# Patient Record
Sex: FEMALE | Race: BLACK OR AFRICAN AMERICAN | Employment: PART TIME | URBAN - METROPOLITAN AREA
[De-identification: names, ages, dates, MRNs, and addresses within clinical notes are randomized per-mention and may not be internally consistent; named-entity substitution may affect disease eponyms.]

---

## 2017-03-11 ENCOUNTER — APPOINTMENT (OUTPATIENT)
Dept: GENERAL RADIOLOGY | Age: 37
DRG: 133 | End: 2017-03-11
Attending: PHYSICIAN ASSISTANT
Payer: MEDICAID

## 2017-03-11 ENCOUNTER — HOSPITAL ENCOUNTER (INPATIENT)
Age: 37
LOS: 1 days | Discharge: HOME | DRG: 133 | End: 2017-03-13
Attending: EMERGENCY MEDICINE | Admitting: HOSPITALIST
Payer: MEDICAID

## 2017-03-11 ENCOUNTER — APPOINTMENT (OUTPATIENT)
Dept: NUCLEAR MEDICINE | Age: 37
DRG: 133 | End: 2017-03-11
Attending: PHYSICIAN ASSISTANT
Payer: MEDICAID

## 2017-03-11 DIAGNOSIS — R42 DIZZINESS: ICD-10-CM

## 2017-03-11 DIAGNOSIS — R09.02 HYPOXIA: Primary | ICD-10-CM

## 2017-03-11 LAB
ALBUMIN SERPL BCP-MCNC: 3.6 G/DL (ref 3.4–5)
ALBUMIN/GLOB SERPL: 0.8 {RATIO} (ref 0.8–1.7)
ALP SERPL-CCNC: 60 U/L (ref 45–117)
ALT SERPL-CCNC: 35 U/L (ref 13–56)
ANION GAP BLD CALC-SCNC: 7 MMOL/L (ref 3–18)
APPEARANCE UR: CLEAR
ARTERIAL PATENCY WRIST A: YES
AST SERPL W P-5'-P-CCNC: 37 U/L (ref 15–37)
BACTERIA URNS QL MICRO: ABNORMAL /HPF
BASE EXCESS BLD CALC-SCNC: 1 MMOL/L
BASOPHILS # BLD AUTO: 0 K/UL (ref 0–0.06)
BASOPHILS # BLD: 0 % (ref 0–2)
BDY SITE: ABNORMAL
BILIRUB SERPL-MCNC: 0.3 MG/DL (ref 0.2–1)
BILIRUB UR QL: ABNORMAL
BNP SERPL-MCNC: 34 PG/ML (ref 0–450)
BUN SERPL-MCNC: 17 MG/DL (ref 7–18)
BUN/CREAT SERPL: 9 (ref 12–20)
CALCIUM SERPL-MCNC: 8.8 MG/DL (ref 8.5–10.1)
CHLORIDE SERPL-SCNC: 94 MMOL/L (ref 100–108)
CK MB CFR SERPL CALC: 0.3 % (ref 0–4)
CK MB SERPL-MCNC: 1.7 NG/ML (ref 5–25)
CK SERPL-CCNC: 525 U/L (ref 26–192)
CO2 SERPL-SCNC: 34 MMOL/L (ref 21–32)
COLOR UR: YELLOW
CREAT SERPL-MCNC: 1.94 MG/DL (ref 0.6–1.3)
D DIMER PPP FEU-MCNC: 0.43 UG/ML(FEU)
DIFFERENTIAL METHOD BLD: ABNORMAL
EOSINOPHIL # BLD: 0 K/UL (ref 0–0.4)
EOSINOPHIL NFR BLD: 1 % (ref 0–5)
EPITH CASTS URNS QL MICRO: ABNORMAL /LPF (ref 0–5)
ERYTHROCYTE [DISTWIDTH] IN BLOOD BY AUTOMATED COUNT: 12.9 % (ref 11.6–14.5)
FLUAV AG NPH QL IA: NEGATIVE
FLUBV AG NOSE QL IA: NEGATIVE
GAS FLOW.O2 O2 DELIVERY SYS: ABNORMAL L/MIN
GAS FLOW.O2 SETTING OXYMISER: 2 L/M
GLOBULIN SER CALC-MCNC: 4.6 G/DL (ref 2–4)
GLUCOSE SERPL-MCNC: 92 MG/DL (ref 74–99)
GLUCOSE UR STRIP.AUTO-MCNC: NEGATIVE MG/DL
HCG SERPL QL: NEGATIVE
HCG UR QL: NEGATIVE
HCO3 BLD-SCNC: 28.2 MMOL/L (ref 22–26)
HCT VFR BLD AUTO: 43.4 % (ref 35–45)
HGB BLD-MCNC: 13.8 G/DL (ref 12–16)
HGB UR QL STRIP: NEGATIVE
KETONES UR QL STRIP.AUTO: 15 MG/DL
LEUKOCYTE ESTERASE UR QL STRIP.AUTO: NEGATIVE
LYMPHOCYTES # BLD AUTO: 20 % (ref 21–52)
LYMPHOCYTES # BLD: 1.1 K/UL (ref 0.9–3.6)
MAGNESIUM SERPL-MCNC: 1.7 MG/DL (ref 1.8–2.4)
MCH RBC QN AUTO: 29.4 PG (ref 24–34)
MCHC RBC AUTO-ENTMCNC: 31.8 G/DL (ref 31–37)
MCV RBC AUTO: 92.3 FL (ref 74–97)
MONOCYTES # BLD: 1.4 K/UL (ref 0.05–1.2)
MONOCYTES NFR BLD AUTO: 25 % (ref 3–10)
NEUTS SEG # BLD: 3.1 K/UL (ref 1.8–8)
NEUTS SEG NFR BLD AUTO: 54 % (ref 40–73)
NITRITE UR QL STRIP.AUTO: NEGATIVE
PCO2 BLD: 52 MMHG (ref 35–45)
PH BLD: 7.34 [PH] (ref 7.35–7.45)
PH UR STRIP: 5.5 [PH] (ref 5–8)
PLATELET # BLD AUTO: 294 K/UL (ref 135–420)
PMV BLD AUTO: 9.4 FL (ref 9.2–11.8)
PO2 BLD: 72 MMHG (ref 80–100)
POTASSIUM SERPL-SCNC: 3.8 MMOL/L (ref 3.5–5.5)
PROT SERPL-MCNC: 8.2 G/DL (ref 6.4–8.2)
PROT UR STRIP-MCNC: 100 MG/DL
RBC # BLD AUTO: 4.7 M/UL (ref 4.2–5.3)
RBC #/AREA URNS HPF: ABNORMAL /HPF (ref 0–5)
SAO2 % BLD: 93 % (ref 92–97)
SERVICE CMNT-IMP: ABNORMAL
SODIUM SERPL-SCNC: 135 MMOL/L (ref 136–145)
SP GR UR REFRACTOMETRY: 1.02 (ref 1–1.03)
SPECIMEN TYPE: ABNORMAL
TOTAL RESP. RATE, ITRR: 20
TROPONIN I SERPL-MCNC: <0.02 NG/ML (ref 0–0.04)
UROBILINOGEN UR QL STRIP.AUTO: 1 EU/DL (ref 0.2–1)
WBC # BLD AUTO: 5.7 K/UL (ref 4.6–13.2)
WBC URNS QL MICRO: ABNORMAL /HPF (ref 0–4)

## 2017-03-11 PROCEDURE — 96365 THER/PROPH/DIAG IV INF INIT: CPT

## 2017-03-11 PROCEDURE — 80053 COMPREHEN METABOLIC PANEL: CPT | Performed by: PHYSICIAN ASSISTANT

## 2017-03-11 PROCEDURE — 71010 XR CHEST PORT: CPT

## 2017-03-11 PROCEDURE — 74011250636 HC RX REV CODE- 250/636: Performed by: PHYSICIAN ASSISTANT

## 2017-03-11 PROCEDURE — 81025 URINE PREGNANCY TEST: CPT | Performed by: PHYSICIAN ASSISTANT

## 2017-03-11 PROCEDURE — 96361 HYDRATE IV INFUSION ADD-ON: CPT

## 2017-03-11 PROCEDURE — 85025 COMPLETE CBC W/AUTO DIFF WBC: CPT | Performed by: PHYSICIAN ASSISTANT

## 2017-03-11 PROCEDURE — 82803 BLOOD GASES ANY COMBINATION: CPT

## 2017-03-11 PROCEDURE — 87804 INFLUENZA ASSAY W/OPTIC: CPT | Performed by: PHYSICIAN ASSISTANT

## 2017-03-11 PROCEDURE — 84703 CHORIONIC GONADOTROPIN ASSAY: CPT | Performed by: PHYSICIAN ASSISTANT

## 2017-03-11 PROCEDURE — 85379 FIBRIN DEGRADATION QUANT: CPT | Performed by: PHYSICIAN ASSISTANT

## 2017-03-11 PROCEDURE — 83880 ASSAY OF NATRIURETIC PEPTIDE: CPT | Performed by: PHYSICIAN ASSISTANT

## 2017-03-11 PROCEDURE — 83735 ASSAY OF MAGNESIUM: CPT | Performed by: PHYSICIAN ASSISTANT

## 2017-03-11 PROCEDURE — A9567 TECHNETIUM TC-99M AEROSOL: HCPCS

## 2017-03-11 PROCEDURE — 99285 EMERGENCY DEPT VISIT HI MDM: CPT

## 2017-03-11 PROCEDURE — 93005 ELECTROCARDIOGRAM TRACING: CPT

## 2017-03-11 PROCEDURE — 74011000250 HC RX REV CODE- 250: Performed by: PHYSICIAN ASSISTANT

## 2017-03-11 PROCEDURE — 96375 TX/PRO/DX INJ NEW DRUG ADDON: CPT

## 2017-03-11 PROCEDURE — 82550 ASSAY OF CK (CPK): CPT | Performed by: PHYSICIAN ASSISTANT

## 2017-03-11 PROCEDURE — 81001 URINALYSIS AUTO W/SCOPE: CPT | Performed by: PHYSICIAN ASSISTANT

## 2017-03-11 PROCEDURE — 36600 WITHDRAWAL OF ARTERIAL BLOOD: CPT

## 2017-03-11 RX ORDER — MAGNESIUM SULFATE HEPTAHYDRATE 40 MG/ML
2 INJECTION, SOLUTION INTRAVENOUS
Status: COMPLETED | OUTPATIENT
Start: 2017-03-11 | End: 2017-03-11

## 2017-03-11 RX ORDER — SODIUM CHLORIDE 9 MG/ML
1000 INJECTION, SOLUTION INTRAVENOUS ONCE
Status: COMPLETED | OUTPATIENT
Start: 2017-03-11 | End: 2017-03-11

## 2017-03-11 RX ORDER — IPRATROPIUM BROMIDE AND ALBUTEROL SULFATE 2.5; .5 MG/3ML; MG/3ML
3 SOLUTION RESPIRATORY (INHALATION)
Status: COMPLETED | OUTPATIENT
Start: 2017-03-11 | End: 2017-03-11

## 2017-03-11 RX ORDER — IPRATROPIUM BROMIDE AND ALBUTEROL SULFATE 2.5; .5 MG/3ML; MG/3ML
3 SOLUTION RESPIRATORY (INHALATION)
Status: COMPLETED | OUTPATIENT
Start: 2017-03-11 | End: 2017-03-12

## 2017-03-11 RX ADMIN — IPRATROPIUM BROMIDE AND ALBUTEROL SULFATE 3 ML: .5; 3 SOLUTION RESPIRATORY (INHALATION) at 19:14

## 2017-03-11 RX ADMIN — METHYLPREDNISOLONE SODIUM SUCCINATE 125 MG: 125 INJECTION, POWDER, FOR SOLUTION INTRAMUSCULAR; INTRAVENOUS at 19:14

## 2017-03-11 RX ADMIN — SODIUM CHLORIDE 1000 ML: 900 INJECTION, SOLUTION INTRAVENOUS at 19:13

## 2017-03-11 RX ADMIN — MAGNESIUM SULFATE HEPTAHYDRATE 2 G: 40 INJECTION, SOLUTION INTRAVENOUS at 19:15

## 2017-03-11 NOTE — IP AVS SNAPSHOT
[address] Patient: Kurtis Rain MRN: [id number] HOR:[birth year] You are allergic to the following Allergen Reactions Aspirin Nausea and Vomiting Swelling Recent Documentation Height Weight Breastfeeding? BMI Smoking Status 1.651 m (!) 190.1 kg No 69.73 kg/m2 Never Smoker Emergency Contacts Name Discharge Info Relation Home Work Mobile Ravinder Gutierrez DISCHARGE CAREGIVER [3] Parent [1] [phone] About your hospitalization You were admitted on:  March 12, 2017 You last received care in the:  [facility] [address] You were discharged on:  March 13, 2017 Unit phone number:  [phone] Why you were hospitalized Your primary diagnosis was:  Not on File Your diagnoses also included:  Hypoxia, Asthmatic Bronchitis With Acute Exacerbation Providers Seen During Your Hospitalizations Provider Role Specialty Primary office phone Gabe Harrell MD Attending Provider Emergency Medicine [phone] Dominic Byrd MD Attending Provider Emergency Medicine [phone] Odalys Steward MD Attending Provider Internal Medicine [phone] Your Primary Care Physician (PCP) Primary Care Physician Office Phone Office Fax OTHER, PHYS ** None ** ** None ** Follow-up Information Follow up With Details Comments Contact Info Corinna Titus MD   Patient can only remember the practice name and not the physician Current Discharge Medication List  
  
START taking these medications Dose & Instructions Dispensing Information Comments Morning Noon Evening Bedtime  
 doxycycline 100 mg capsule Commonly known as:  VIBRAMYCIN Your last dose was: Your next dose is: Other:  _________  Dose:  100 mg  
 Take 1 Cap by mouth every twelve (12) hours for 5 days. Quantity:  10 Cap Refills:  0  
     
   
   
   
  
 predniSONE 20 mg tablet Commonly known as:  Nathaniel Dixon Your last dose was: Your next dose is: Other:  _________ Dose:  60 mg Take 3 Tabs by mouth daily (after dinner) for 4 days. Quantity:  12 Tab Refills:  0 CONTINUE these medications which have NOT CHANGED Dose & Instructions Dispensing Information Comments Morning Noon Evening Bedtime  
 chlorthalidone 25 mg tablet Commonly known as:  Talisha Hutson Your last dose was: Your next dose is: Other:  _________ Dose:  25 mg Take 25 mg by mouth daily. Refills:  0  
     
   
   
   
  
 fluticasone-salmeterol 250-50 mcg/dose diskus inhaler Commonly known as:  ADVAIR Your last dose was: Your next dose is: Other:  _________ Dose:  1 Puff Take 1 Puff by inhalation two (2) times a day. Quantity:  1 Inhaler Refills:  1  
     
   
   
   
  
 guaiFENesin-codeine 100-10 mg/5 mL solution Commonly known as:  ROBITUSSIN AC Your last dose was: Your next dose is: Other:  _________ Dose:  5 mL Take 5 mL by mouth three (3) times daily as needed for Cough. Max Daily Amount: 15 mL. Quantity:  90 mL Refills:  0  
     
   
   
   
  
 hydrOXYzine HCl 10 mg tablet Commonly known as:  ATARAX Your last dose was: Your next dose is: Other:  _________ Take  by mouth every eight (8) hours as needed for Itching or Anxiety. Refills:  0 LIPITOR 40 mg tablet Generic drug:  atorvastatin Your last dose was: Your next dose is: Other:  _________ Dose:  40 mg Take 40 mg by mouth nightly. Refills:  0  
     
   
   
   
  
 metFORMIN 500 mg tablet Commonly known as:  GLUCOPHAGE  
   
 Your last dose was: Your next dose is: Other:  _________ Dose:  500 mg Take 500 mg by mouth two (2) times daily (with meals). Refills:  0  
     
   
   
   
  
 spironolactone 100 mg tablet Commonly known as:  ALDACTONE Your last dose was: Your next dose is: Other:  _________ Dose:  100 mg Take 100 mg by mouth daily. Refills:  0  
     
   
   
   
  
 VITAMIN D2 50,000 unit capsule Generic drug:  ergocalciferol Your last dose was: Your next dose is: Other:  _________ Dose:  15082 Units Take 50,000 Units by mouth every seven (7) days. Refills:  0 STOP taking these medications   
 amLODIPine 10 mg tablet Commonly known as:  NORVASC  
   
  
 benazepril 20 mg tablet Commonly known as:  LOTENSIN  
   
  
 carvedilol 25 mg tablet Commonly known as:  COREG  
   
  
 furosemide 20 mg tablet Commonly known as:  LASIX  
   
  
 labetalol 200 mg tablet Commonly known as:  Marlane Angelucci Where to Get Your Medications Information on where to get these meds will be given to you by the nurse or doctor. ! Ask your nurse or doctor about these medications  
  doxycycline 100 mg capsule  
 predniSONE 20 mg tablet Discharge Instructions Asthma Attack: Care Instructions Your Care Instructions During an asthma attack, the airways swell and narrow. This makes it hard to breathe. Severe asthma attacks can be life-threatening, but you can help prevent them by keeping your asthma under control and treating symptoms before they get bad. Symptoms include being short of breath, having chest tightness, coughing, and wheezing. Noting and treating these symptoms can also help you avoid future trips to the emergency room. The doctor has checked you carefully, but problems can develop later.  If you notice any problems or new symptoms, get medical treatment right away. Follow-up care is a key part of your treatment and safety. Be sure to make and go to all appointments, and call your doctor if you are having problems. It's also a good idea to know your test results and keep a list of the medicines you take. How can you care for yourself at home? · Follow your asthma action plan to prevent and treat attacks. If you don't have an asthma action plan, work with your doctor to create one. · Take your asthma medicines exactly as prescribed. Talk to your doctor right away if you have any questions about how to take them. ¨ Use your quick-relief medicine when you have symptoms of an attack. Quick-relief medicine is usually an albuterol inhaler. Some people need to use quick-relief medicine before they exercise. ¨ Take your controller medicine every day, not just when you have symptoms. Controller medicine is usually an inhaled corticosteroid. The goal is to prevent problems before they occur. Don't use your controller medicine to treat an attack that has already started. It doesn't work fast enough to help. ¨ If your doctor prescribed corticosteroid pills to use during an attack, take them exactly as prescribed. It may take hours for the pills to work, but they may make the episode shorter and help you breathe better. ¨ Keep your quick-relief medicine with you at all times. · Talk to your doctor before using other medicines. Some medicines, such as aspirin, can cause asthma attacks in some people. · If you have a peak flow meter, use it to check how well you are breathing. This can help you predict when an asthma attack is going to occur. Then you can take medicine to prevent the asthma attack or make it less severe. · Do not smoke or allow others to smoke around you. Avoid smoky places. Smoking makes asthma worse.  If you need help quitting, talk to your doctor about stop-smoking programs and medicines. These can increase your chances of quitting for good. · Learn what triggers an asthma attack for you, and avoid the triggers when you can. Common triggers include colds, smoke, air pollution, dust, pollen, mold, pets, cockroaches, stress, and cold air. · Avoid colds and the flu. Get a pneumococcal vaccine shot. If you have had one before, ask your doctor if you need a second dose. Get a flu vaccine every fall. If you must be around people with colds or the flu, wash your hands often. When should you call for help? Call 911 anytime you think you may need emergency care. For example, call if: 
· You have severe trouble breathing. Call your doctor now or seek immediate medical care if: 
· Your symptoms do not get better after you have followed your asthma action plan. · You have new or worse trouble breathing. · Your coughing and wheezing get worse. · You cough up dark brown or bloody mucus (sputum). · You have a new or higher fever. Watch closely for changes in your health, and be sure to contact your doctor if: 
· You need to use quick-relief medicine on more than 2 days a week (unless it is just for exercise). · You cough more deeply or more often, especially if you notice more mucus or a change in the color of your mucus. · You are not getting better as expected. Where can you learn more? Go to http://dilcia-esha.info/. Enter N709 in the search box to learn more about \"Asthma Attack: Care Instructions. \" Current as of: May 23, 2016 Content Version: 11.1 © 2396-5840 Healthwise, Incorporated. Care instructions adapted under license by [employer] (which disclaims liability or warranty for this information). If you have questions about a medical condition or this instruction, always ask your healthcare professional. [address] any warranty or liability for your use of this information. Learning About Asthma Triggers What are triggers? When you have asthma, certain things can make your symptoms worse. These are called triggers. They include: · Cigarette smoke or air pollution. · Things you are allergic to, such as: 
¨ Pollen, mold, or dust mites. ¨ Pet hair, skin, or saliva. · Illnesses, like colds, flu, or pneumonia. · Exercise. · Dry, cold air. How do triggers affect asthma? Triggers can make it harder for your lungs to work as they should and can lead to sudden difficulty breathing and other symptoms. When you are around a trigger, an asthma attack is more likely. If your symptoms are severe, you may need emergency treatment or have to go to the hospital for treatment. If you know what your triggers are and can avoid them, you may be able to prevent asthma attacks, reduce how often you have them, and make them less severe. What can you do to avoid triggers? The first thing is to know your triggers. When you are having symptoms, note the things around you that might be causing them. Then look for patterns in what may be triggering your symptoms. Record your triggers on a piece of paper or in an asthma diary. When you have your list of possible triggers, work with your doctor to find ways to avoid them. You also can check how well your lungs are working by measuring your peak expiratory flow (PEF) throughout the day. Your PEF may drop when you are near things that trigger symptoms. Here are some ways to avoid a few common triggers. · Do not smoke or allow others to smoke around you. If you need help quitting, talk to your doctor about stop-smoking programs and medicines. These can increase your chances of quitting for good. · If there is a lot of pollution, pollen, or dust outside, stay at home and keep your windows closed. Use an air conditioner or air filter in your home. Check your local weather report or newspaper for air quality and pollen reports. · Get a flu shot every year. Talk to your doctor about getting a pneumococcal shot. Wash your hands often to prevent infections. · Avoid exercising outdoors in cold weather. If you are outdoors in cold weather, wear a scarf around your face and breathe through your nose. How can you manage an asthma attack? · If you have an asthma action plan, follow the plan. In general: ¨ Use your quick-relief inhaler as directed by your doctor. If your symptoms do not get better after you use your medicine, have someone take you to the emergency room. Call an ambulance if needed. ¨ If your doctor has given you other inhaled medicines or steroid pills, take them as directed. Where can you learn more? Go to ticketstreet.be Enter V705 in the search box to learn more about \"Learning About Asthma Triggers. \"  
© 0022-6252 Healthwise, Incorporated. Care instructions adapted under license by [employer] (which disclaims liability or warranty for this information). This care instruction is for use with your licensed healthcare professional. If you have questions about a medical condition or this instruction, always ask your healthcare professional. [place] [address] any warranty or liability for your use of this information. Content Version: 32.7.649430; Last Revised: February 23, 2012 Asthma Action Plan: After Your Visit Your Care Instructions An asthma action plan is based on peak flow and asthma symptoms. Sorting symptoms and peak flow into red, yellow, and green \"zones\" can help you know how bad your asthma is and what actions you should take. Work with your doctor to make your plan. An action plan may include: · The peak flow readings and symptoms for each zone. · What medicines to take in each zone. · When to call a doctor. · A list of emergency contact numbers. · A list of your asthma triggers. Follow-up care is a key part of your treatment and safety. Be sure to make and go to all appointments, and call your doctor if you are having problems. It's also a good idea to know your test results and keep a list of the medicines you take. How can you care for yourself at home? · Take your daily medicines to help minimize long-term damage and avoid asthma attacks. · Check your peak flow every morning and evening. This is the best way to know how well your lungs are working. · Check your action plan to see what zone you are in. 
¨ If you are in the green zone, keep taking your daily asthma medicines as prescribed. ¨ If you are in the yellow zone, you may be having or will soon have an asthma attack. You may not have any symptoms, but your lungs are not working as well as they should. Take the medicines listed in your action plan. If you stay in the yellow zone, your doctor may need to increase the dose or add a medicine. ¨ If you are in the red zone, follow your action plan. If your symptoms or peak flow don't improve soon, you may need to go to the emergency room or be admitted to the hospital. 
· Use an asthma diary. Write down your peak flow readings in the asthma diary. If you have an attack, write down what caused it (if you know), the symptoms, and what medicine you took. · Make sure you know how and when to call your doctor or go to the hospital. 
· Take both the asthma action plan and the asthma diaryalong with your peak flow meter and medicineswhen you see your doctor. Tell your doctor if you are having trouble following your action plan. When should you call for help? Call 911 anytime you think you may need emergency care. For example, call if: 
· You have severe trouble breathing. Call your doctor now or seek immediate medical care if: 
· Your symptoms do not get better after you have followed your asthma action plan. · You cough up yellow, dark brown, or bloody mucus (sputum). Watch closely for changes in your health, and be sure to contact your doctor if: 
· Your coughing and wheezing get worse. · You need to use quick-relief medicine on more than 2 days a week (unless it is just for exercise). · You need help figuring out what is triggering your asthma attacks. Where can you learn more? Go to [employer].be Enter 728 3392 in the search box to learn more about \"Asthma Action Plan: After Your Visit. \"  
© 9848-9681 Healthwise, Incorporated. Care instructions adapted under license by [employer] (which disclaims liability or warranty for this information). This care instruction is for use with your licensed healthcare professional. If you have questions about a medical condition or this instruction, always ask your healthcare professional. [address] any warranty or liability for your use of this information. Content Version: 37.0.971950; Last Revised: March 9, 2012 DISCHARGE SUMMARY from Nurse The following personal items are in your possession at time of discharge: 
 
Dental Appliances: None Visual Aid: None Home Medications: None Jewelry: Ring Clothing: At bedside, Pants, Dress Other Valuables: Cell Phone, [employer] PATIENT INSTRUCTIONS: 
 
 
F-face looks uneven A-arms unable to move or move unevenly S-speech slurred or non-existent T-time-call 911 as soon as signs and symptoms begin-DO NOT go Back to bed or wait to see if you get better-TIME IS BRAIN. Warning Signs of HEART ATTACK Call 911 if you have these symptoms: 
? Chest discomfort.  Most heart attacks involve discomfort in the center of the chest that lasts more than a few minutes, or that goes away and comes back. It can feel like uncomfortable pressure, squeezing, fullness, or pain. ? Discomfort in other areas of the upper body. Symptoms can include pain or discomfort in one or both arms, the back, neck, jaw, or stomach. ? Shortness of breath with or without chest discomfort. ? Other signs may include breaking out in a cold sweat, nausea, or lightheadedness. Don't wait more than five minutes to call [address]! Fast action can save your life. Calling 911 is almost always the fastest way to get lifesaving treatment. Emergency Medical Services staff can begin treatment when they arrive  up to an hour sooner than if someone gets to the hospital by car. The discharge information has been reviewed with the patient. The patient verbalized understanding. Discharge medications reviewed with the patient and appropriate educational materials and side effects teaching were provided. DISCHARGE SUMMARY from Nurse The following personal items are in your possession at time of discharge: 
 
Dental Appliances: None Visual Aid: None Home Medications: None Jewelry: Ring Clothing: At bedside, Pants, Dress Other Valuables: Cell Phone, Adela PATIENT INSTRUCTIONS: 
 
 
F-face looks uneven A-arms unable to move or move unevenly S-speech slurred or non-existent T-time-call 911 as soon as signs and symptoms begin-DO NOT go Back to bed or wait to see if you get better-TIME IS BRAIN. Warning Signs of HEART ATTACK Call 911 if you have these symptoms: 
? Chest discomfort.  Most heart attacks involve discomfort in the center of the chest that lasts more than a few minutes, or that goes away and comes back. It can feel like uncomfortable pressure, squeezing, fullness, or pain. ? Discomfort in other areas of the upper body. Symptoms can include pain or discomfort in one or both arms, the back, neck, jaw, or stomach. ? Shortness of breath with or without chest discomfort. ? Other signs may include breaking out in a cold sweat, nausea, or lightheadedness. Don't wait more than five minutes to call [address]! Fast action can save your life. Calling 911 is almost always the fastest way to get lifesaving treatment. Emergency Medical Services staff can begin treatment when they arrive  up to an hour sooner than if someone gets to the hospital by car. The discharge information has been reviewed with the patient. The patient verbalized understanding. Discharge medications reviewed with the patient and appropriate educational materials and side effects teaching were provided. Patient {[id number] [employer] Activation Thank you for requesting access to [employer]. Please follow the instructions below to securely access and download your online medical record. [employer] allows you to send messages to your doctor, view your test results, renew your prescriptions, schedule appointments, and more. How Do I Sign Up? 1. In your internet browser, go to [email or web address] 
2. Click on the First Time User? Click Here link in the Sign In box. You will be redirect to the New Member Sign Up page. 3. Enter your [employer] Access Code exactly as it appears below. You will not need to use this code after youve completed the sign-up process. If you do not sign up before the expiration date, you must request a new code. [employer] Access Code: [email or web address] Expires: 2017  1:21 PM (This is the date your [employer] access code will ) 4. Enter the last four digits of your Social Security Number (xxxx) and Date of Birth (mm/dd/yyyy) as indicated and click Submit.  You will be taken to the next sign-up page. 5. Create a Systems[employer] ID. This will be your [employer] login ID and cannot be changed, so think of one that is secure and easy to remember. 6. Create a [employer] password. You can change your password at any time. 7. Enter your Password Reset Question and Answer. This can be used at a later time if you forget your password. 8. Enter your e-mail address. You will receive e-mail notification when new information is available in [address] 9. Click Sign Up. You can now view and download portions of your medical record. 10. Click the Download Summary menu link to download a portable copy of your medical information. Additional Information If you have questions, please visit the Frequently Asked Questions section of the Systems[employer] website at [email or web address]/. Remember, Systems[employer] is NOT to be used for urgent needs. For medical emergencies, dial 911. Discharge Orders None [employer] Announcement We are excited to announce that we are making your provider's discharge notes available to you in Systems[employer]. You will see these notes when they are completed and signed by the physician that discharged you from your recent hospital stay. If you have any questions or concerns about any information you see in Systems[employer], please call the Health Information Department where you were seen or reach out to your Primary Care Provider for more information about your plan of care. Introducing [facility]! [employer] introduces [employer] patient portal. Now you can access parts of your medical record, email your doctor's office, and request medication refills online. 1. In your internet browser, go to [email or web address] [email or web address] 2. Click on the First Time User? Click Here link in the Sign In box. You will see the New Member Sign Up page. 3. Enter your [employer] Access Code exactly as it appears below.  You will not need to use this code after youve completed the sign-up process. If you do not sign up before the expiration date, you must request a new code. · [employer] Access Code: [email or web address] Expires: 6/9/2017  1:21 PM 
 
4. Enter the last four digits of your Social Security Number (xxxx) and Date of Birth (mm/dd/yyyy) as indicated and click Submit. You will be taken to the next sign-up page. 5. Create a [employer] ID. This will be your [employer] login ID and cannot be changed, so think of one that is secure and easy to remember. 6. Create a [employer] password. You can change your password at any time. 7. Enter your Password Reset Question and Answer. This can be used at a later time if you forget your password. 8. Enter your e-mail address. You will receive e-mail notification when new information is available in [address] 9. Click Sign Up. You can now view and download portions of your medical record. 10. Click the Download Summary menu link to download a portable copy of your medical information. If you have questions, please visit the Frequently Asked Questions section of the [employer] website. Remember, [employer] is NOT to be used for urgent needs. For medical emergencies, dial 911. Now available from your iPhone and Android! General Information Please provide this summary of care documentation to your next provider. Patient Signature:  ____________________________________________________________ Date:  ____________________________________________________________  
  
Josph Perfect Provider Signature:  ____________________________________________________________ Date:  ____________________________________________________________

## 2017-03-11 NOTE — IP AVS SNAPSHOT
Summary of Care Report The Summary of Care report has been created to help improve care coordination. Users with access to [employer] or [employer] [address] (Web-based application) may access additional patient information including the Discharge Summary. If you are not currently a [address] user and need more information, please call the number listed below in the [place] [address] section and ask to be connected with Medical Records. Facility Information Name Address Phone [address] [place] [address] [address] [facility] [address] [phone] Patient Information Patient Name Sex STEPHEN Enciso ([id number]) Female 1980 Discharge Information Admitting Provider Service Area Unit Kelsey Garner III, MD / [facility] 71 / [phone] Discharge Provider Discharge Date/Time Discharge Disposition Destination Romy Guido MD / [phone] 17 1456 [facility] (none) Patient Language Language ENGLISH [13] Problem List as of 3/13/2017  Date Reviewed: 2012 Codes Priority Class Noted - Resolved Chronic obstructive asthma with exacerbation (HCC) ICD-10-CM: J44.1, J45.901 ICD-9-CM: 260.65   2012 - Present Acute bronchitis ICD-10-CM: J20.9 ICD-9-CM: 466.0   2012 - Present HTN (hypertension) ICD-10-CM: I10 
ICD-9-CM: 401.9   2012 - Present Respiratory failure with hypoxia [facility]) ICD-10-CM: J96.91 
ICD-9-CM: 518.81   2012 - Present Morbid obesity ([facility] [address] ICD-10-CM: E66.01 
ICD-9-CM: 278.01   2012 - Present Hypoventilation associated with obesity syndrome (HCC) ICD-10-CM: W28.6 ICD-9-CM: 278.03   2012 - Present Hypoxia ICD-10-CM: R09.02 
ICD-9-CM: 799.02   3/12/2017 - Present Asthmatic bronchitis with acute exacerbation ICD-10-CM: J45. [address] ICD-9-CM: 493.92   3/12/2017 - Present You are allergic to the following Allergen Reactions Aspirin Nausea and Vomiting Swelling Current Discharge Medication List  
  
START taking these medications Dose & Instructions Dispensing Information Comments  
 doxycycline 100 mg capsule Commonly known as:  VIBRAMYCIN Dose:  100 mg Take 1 Cap by mouth every twelve (12) hours for 5 days. Quantity:  10 Cap Refills:  0  
   
 predniSONE 20 mg tablet Commonly known as:  Orlean Aas Dose:  60 mg Take 3 Tabs by mouth daily (after dinner) for 4 days. Quantity:  12 Tab Refills:  0 CONTINUE these medications which have NOT CHANGED Dose & Instructions Dispensing Information Comments  
 chlorthalidone 25 mg tablet Commonly known as:  Chales Chamber Dose:  25 mg Take 25 mg by mouth daily. Refills:  0  
   
 fluticasone-salmeterol 250-50 mcg/dose diskus inhaler Commonly known as:  ADVAIR Dose:  1 Puff Take 1 Puff by inhalation two (2) times a day. Quantity:  1 Inhaler Refills:  1  
   
 guaiFENesin-codeine 100-10 mg/5 mL solution Commonly known as:  ROBITUSSIN AC Dose:  5 mL Take 5 mL by mouth three (3) times daily as needed for Cough. Max Daily Amount: 15 mL. Quantity:  90 mL Refills:  0  
   
 hydrOXYzine HCl 10 mg tablet Commonly known as:  ATARAX Take  by mouth every eight (8) hours as needed for Itching or Anxiety. Refills:  0 LIPITOR 40 mg tablet Generic drug:  atorvastatin Dose:  40 mg Take 40 mg by mouth nightly. Refills:  0  
   
 metFORMIN 500 mg tablet Commonly known as:  GLUCOPHAGE Dose:  500 mg Take 500 mg by mouth two (2) times daily (with meals). Refills:  0  
   
 spironolactone 100 mg tablet Commonly known as:  ALDACTONE Dose:  100 mg Take 100 mg by mouth daily. Refills:  0  
   
 VITAMIN D2 50,000 unit capsule Generic drug:  ergocalciferol Dose:  15967 Units Take 50,000 Units by mouth every seven (7) days. Refills:  0 STOP taking these medications Comments  
 amLODIPine 10 mg tablet Commonly known as:  NORVASC  
   
   
 benazepril 20 mg tablet Commonly known as:  LOTENSIN  
   
   
 carvedilol 25 mg tablet Commonly known as:  COREG  
   
   
 furosemide 20 mg tablet Commonly known as:  LASIX  
   
   
 labetalol 200 mg tablet Commonly known as:  Sarah Brennan Follow-up Information Follow up With Details Comments Contact Info Phys Other, MD   Patient can only remember the practice name and not the physician Discharge Instructions Asthma Attack: Care Instructions Your Care Instructions During an asthma attack, the airways swell and narrow. This makes it hard to breathe. Severe asthma attacks can be life-threatening, but you can help prevent them by keeping your asthma under control and treating symptoms before they get bad. Symptoms include being short of breath, having chest tightness, coughing, and wheezing. Noting and treating these symptoms can also help you avoid future trips to the emergency room. The doctor has checked you carefully, but problems can develop later. If you notice any problems or new symptoms, get medical treatment right away. Follow-up care is a key part of your treatment and safety. Be sure to make and go to all appointments, and call your doctor if you are having problems. It's also a good idea to know your test results and keep a list of the medicines you take. How can you care for yourself at home? · Follow your asthma action plan to prevent and treat attacks. If you don't have an asthma action plan, work with your doctor to create one. · Take your asthma medicines exactly as prescribed. Talk to your doctor right away if you have any questions about how to take them. ¨ Use your quick-relief medicine when you have symptoms of an attack. Quick-relief medicine is usually an albuterol inhaler. Some people need to use quick-relief medicine before they exercise. ¨ Take your controller medicine every day, not just when you have symptoms. Controller medicine is usually an inhaled corticosteroid. The goal is to prevent problems before they occur. Don't use your controller medicine to treat an attack that has already started. It doesn't work fast enough to help. ¨ If your doctor prescribed corticosteroid pills to use during an attack, take them exactly as prescribed. It may take hours for the pills to work, but they may make the episode shorter and help you breathe better. ¨ Keep your quick-relief medicine with you at all times. · Talk to your doctor before using other medicines. Some medicines, such as aspirin, can cause asthma attacks in some people. · If you have a peak flow meter, use it to check how well you are breathing. This can help you predict when an asthma attack is going to occur. Then you can take medicine to prevent the asthma attack or make it less severe. · Do not smoke or allow others to smoke around you. Avoid smoky places. Smoking makes asthma worse. If you need help quitting, talk to your doctor about stop-smoking programs and medicines. These can increase your chances of quitting for good. · Learn what triggers an asthma attack for you, and avoid the triggers when you can. Common triggers include colds, smoke, air pollution, dust, pollen, mold, pets, cockroaches, stress, and cold air. · Avoid colds and the flu. Get a pneumococcal vaccine shot. If you have had one before, ask your doctor if you need a second dose. Get a flu vaccine every fall. If you must be around people with colds or the flu, wash your hands often. When should you call for help? Call 911 anytime you think you may need emergency care. For example, call if: 
· You have severe trouble breathing. Call your doctor now or seek immediate medical care if: · Your symptoms do not get better after you have followed your asthma action plan. · You have new or worse trouble breathing. · Your coughing and wheezing get worse. · You cough up dark brown or bloody mucus (sputum). · You have a new or higher fever. Watch closely for changes in your health, and be sure to contact your doctor if: 
· You need to use quick-relief medicine on more than 2 days a week (unless it is just for exercise). · You cough more deeply or more often, especially if you notice more mucus or a change in the color of your mucus. · You are not getting better as expected. Where can you learn more? Go to http://dilcia-esha.info/. Enter Q034 in the search box to learn more about \"Asthma Attack: Care Instructions. \" Current as of: May 23, 2016 Content Version: 11.1 © 9894-2247 [employer]. Care instructions adapted under license by [employer] (which disclaims liability or warranty for this information). If you have questions about a medical condition or this instruction, always ask your healthcare professional. [address] any warranty or liability for your use of this information. Learning About Asthma Triggers What are triggers? When you have asthma, certain things can make your symptoms worse. These are called triggers. They include: · Cigarette smoke or air pollution. · Things you are allergic to, such as: 
¨ Pollen, mold, or dust mites. ¨ Pet hair, skin, or saliva. · Illnesses, like colds, flu, or pneumonia. · Exercise. · Dry, cold air. How do triggers affect asthma? Triggers can make it harder for your lungs to work as they should and can lead to sudden difficulty breathing and other symptoms. When you are around a trigger, an asthma attack is more likely. If your symptoms are severe, you may need emergency treatment or have to go to the hospital for treatment. If you know what your triggers are and can avoid them, you may be able to prevent asthma attacks, reduce how often you have them, and make them less severe. What can you do to avoid triggers? The first thing is to know your triggers. When you are having symptoms, note the things around you that might be causing them. Then look for patterns in what may be triggering your symptoms. Record your triggers on a piece of paper or in an asthma diary. When you have your list of possible triggers, work with your doctor to find ways to avoid them. You also can check how well your lungs are working by measuring your peak expiratory flow (PEF) throughout the day. Your PEF may drop when you are near things that trigger symptoms. Here are some ways to avoid a few common triggers. · Do not smoke or allow others to smoke around you. If you need help quitting, talk to your doctor about stop-smoking programs and medicines. These can increase your chances of quitting for good. · If there is a lot of pollution, pollen, or dust outside, stay at home and keep your windows closed. Use an air conditioner or air filter in your home. Check your local weather report or newspaper for air quality and pollen reports. · Get a flu shot every year. Talk to your doctor about getting a pneumococcal shot. Wash your hands often to prevent infections. · Avoid exercising outdoors in cold weather. If you are outdoors in cold weather, wear a scarf around your face and breathe through your nose. How can you manage an asthma attack? · If you have an asthma action plan, follow the plan. In general: ¨ Use your quick-relief inhaler as directed by your doctor. If your symptoms do not get better after you use your medicine, have someone take you to the emergency room. Call an ambulance if needed. ¨ If your doctor has given you other inhaled medicines or steroid pills, take them as directed. Where can you learn more? Go to [employer].be Enter J346 in the search box to learn more about \"Learning About Asthma Triggers. \"  
© 1541-4826 Healthwise, Incorporated. Care instructions adapted under license by [employer] (which disclaims liability or warranty for this information). This care instruction is for use with your licensed healthcare professional. If you have questions about a medical condition or this instruction, always ask your healthcare professional. [place] [address] any warranty or liability for your use of this information. Content Version: 42.2.393876; Last Revised: February 23, 2012 Asthma Action Plan: After Your Visit Your Care Instructions An asthma action plan is based on peak flow and asthma symptoms. Sorting symptoms and peak flow into red, yellow, and green \"zones\" can help you know how bad your asthma is and what actions you should take. Work with your doctor to make your plan. An action plan may include: · The peak flow readings and symptoms for each zone. · What medicines to take in each zone. · When to call a doctor. · A list of emergency contact numbers. · A list of your asthma triggers. Follow-up care is a key part of your treatment and safety. Be sure to make and go to all appointments, and call your doctor if you are having problems. It's also a good idea to know your test results and keep a list of the medicines you take. How can you care for yourself at home? · Take your daily medicines to help minimize long-term damage and avoid asthma attacks. · Check your peak flow every morning and evening. This is the best way to know how well your lungs are working. · Check your action plan to see what zone you are in. 
¨ If you are in the green zone, keep taking your daily asthma medicines as prescribed. ¨ If you are in the yellow zone, you may be having or will soon have an asthma attack.  You may not have any symptoms, but your lungs are not working as well as they should. Take the medicines listed in your action plan. If you stay in the yellow zone, your doctor may need to increase the dose or add a medicine. ¨ If you are in the red zone, follow your action plan. If your symptoms or peak flow don't improve soon, you may need to go to the emergency room or be admitted to the hospital. 
· Use an asthma diary. Write down your peak flow readings in the asthma diary. If you have an attack, write down what caused it (if you know), the symptoms, and what medicine you took. · Make sure you know how and when to call your doctor or go to the hospital. 
· Take both the asthma action plan and the asthma diaryalong with your peak flow meter and medicineswhen you see your doctor. Tell your doctor if you are having trouble following your action plan. When should you call for help? Call 911 anytime you think you may need emergency care. For example, call if: 
· You have severe trouble breathing. Call your doctor now or seek immediate medical care if: 
· Your symptoms do not get better after you have followed your asthma action plan. · You cough up yellow, dark brown, or bloody mucus (sputum). Watch closely for changes in your health, and be sure to contact your doctor if: 
· Your coughing and wheezing get worse. · You need to use quick-relief medicine on more than 2 days a week (unless it is just for exercise). · You need help figuring out what is triggering your asthma attacks. Where can you learn more? Go to "[employer].be Enter 602 6364 in the search box to learn more about \"Asthma Action Plan: After Your Visit. \"  
© 6783-6727 Healthwise, Incorporated. Care instructions adapted under license by Syl[employer] (which disclaims liability or warranty for this information).  This care instruction is for use with your licensed healthcare professional. If you have questions about a medical condition or this instruction, always ask your healthcare professional. [place] [address] any warranty or liability for your use of this information. Content Version: 62.8.097031; Last Revised: March 9, 2012 DISCHARGE SUMMARY from Nurse The following personal items are in your possession at time of discharge: 
 
Dental Appliances: None Visual Aid: None Home Medications: None Jewelry: Ring Clothing: At bedside, Pants, Dress Other Valuables: Cell Phone, [address] PATIENT INSTRUCTIONS: 
 
 
F-face looks uneven A-arms unable to move or move unevenly S-speech slurred or non-existent T-time-call 911 as soon as signs and symptoms begin-DO NOT go Back to bed or wait to see if you get better-TIME IS BRAIN. Warning Signs of HEART ATTACK Call 911 if you have these symptoms: 
? Chest discomfort. Most heart attacks involve discomfort in the center of the chest that lasts more than a few minutes, or that goes away and comes back. It can feel like uncomfortable pressure, squeezing, fullness, or pain. ? Discomfort in other areas of the upper body. Symptoms can include pain or discomfort in one or both arms, the back, neck, jaw, or stomach. ? Shortness of breath with or without chest discomfort. ? Other signs may include breaking out in a cold sweat, nausea, or lightheadedness. Don't wait more than five minutes to call [address]! Fast action can save your life. Calling 911 is almost always the fastest way to get lifesaving treatment. Emergency Medical Services staff can begin treatment when they arrive  up to an hour sooner than if someone gets to the hospital by car. The discharge information has been reviewed with the patient.   The patient verbalized understanding. Discharge medications reviewed with the patient and appropriate educational materials and side effects teaching were provided. DISCHARGE SUMMARY from Nurse The following personal items are in your possession at time of discharge: 
 
Dental Appliances: None Visual Aid: None Home Medications: None Jewelry: Ring Clothing: At bedside, Pants, Dress Other Valuables: Cell Phone, [employer] PATIENT INSTRUCTIONS: 
 
After general anesthesia or intravenous sedation, for 24 hours or while taking prescription Narcotics: · Limit your activities · Do not drive and operate hazardous machinery · Do not make important personal or business decisions · Do  not drink alcoholic beverages · If you have not urinated within 8 hours after discharge, please contact your surgeon on call. Report the following to your surgeon: 
· Excessive pain, swelling, redness or odor of or around the surgical area · Temperature over 100.5 · Nausea and vomiting lasting longer than 4 hours or if unable to take medications · Any signs of decreased circulation or nerve impairment to extremity: change in color, persistent  numbness, tingling, coldness or increase pain · Any questions What to do at Home: 
Recommended activity: Activity as tolerated, If you experience any of the following symptoms  Sob or respiratory distress, please follow up with .pcp *  Please give a list of your current medications to your Primary Care Provider. *  Please update this list whenever your medications are discontinued, doses are 
    changed, or new medications (including over-the-counter products) are added. *  Please carry medication information at all times in case of emergency situations. These are general instructions for a healthy lifestyle: No smoking/ No tobacco products/ Avoid exposure to second hand smoke verbalized understanding. Discharge medications reviewed with the patient and appropriate educational materials and side effects teaching were provided. Patient {[id number] MyChart Activation Thank you for requesting access to [facility]. Please follow the instructions below to securely access and download your online medical record. [facility] allows you to send messages to your doctor, view your test results, renew your prescriptions, schedule appointments, and more. How Do I Sign Up? 1. In your internet browser, go to [email or web address] 
2. Click on the First Time User? Click Here link in the Sign In box. You will be redirect to the New Member Sign Up page. 3. Enter your [facility] Access Code exactly as it appears below. You will not need to use this code after youve completed the sign-up process. If you do not sign up before the expiration date, you must request a new code. [facility] Access Code: [email or web address] Expires: 2017  1:21 PM (This is the date your [facility] access code will ) 4. Enter the last four digits of your Social Security Number (xxxx) and Date of Birth (mm/dd/yyyy) as indicated and click Submit. You will be taken to the next sign-up page. 5. Create a [facility] ID. This will be your [facility] login ID and cannot be changed, so think of one that is secure and easy to remember. 6. Create a [facility] password. You can change your password at any time. 7. Enter your Password Reset Question and Answer. This can be used at a later time if you forget your password. 8. Enter your e-mail address. You will receive e-mail notification when new information is available in [address] 9. Click Sign Up. You can now view and download portions of your medical record. 10. Click the Download Summary menu link to download a portable copy of your medical information. Additional Information If you have questions, please visit the Frequently Asked Questions section of the [email or web address] website at [email or web address] Remember, MyChart is NOT to be used for urgent needs. For medical emergencies, dial 911. Chart Review Routing History Recipient Method Report Sent By Jolynn Alex MD  
Fax: [phone] Phone: [phone] Fax IP Auto Routed Trans Richardson Alex MD [phone] 12/24/2012  5:59 PM 12/24/2012 Shonna Del Angel MD  
Phone: [phone] In Basket IP Auto Routed Trans Richardson Alex MD [[id number]] 12/24/2012  5:59 PM 12/24/2012 Bahman Arenas MD  
Fax: [phone] Phone: [phone] Fax IP Auto Routed MD Shaila [[id number]] 12/28/2012  8:39 PM 12/28/2012 Shonna Del Angel MD  
Phone: [phone] In [employer] Routed MD Shaila [[id number]] 12/28/2012  8:39 PM 12/28/2012 Ghada Greco MD  
Phone: [phone] In Basket IP Auto Routed Trans Ghada Greco MD [[id number]] 1/10/2013 12:53 PM 01/10/2013 Richardson Alex MD  
Fax: [phone] Phone: [phone] Fax IP Auto Routed Trans Ghada Greco MD [[id number]] 1/10/2013 12:53 PM 01/10/2013 Bahman Arenas MD  
Fax: [phone] Phone: [phone] Fax IP Auto Routed Trans Ghada Greco MD [[id number]] 1/10/2013 12:53 PM 01/10/2013 Shonna Del Angel MD  
Phone: [phone] In Basket IP Auto Routed Trans Ghada Greco MD [[id number]] 1/10/2013 12:53 PM 01/10/2013

## 2017-03-11 NOTE — IP AVS SNAPSHOT
Current Discharge Medication List  
  
Take these medications at their scheduled times Dose & Instructions Dispensing Information Comments Morning Noon Evening Bedtime  
 chlorthalidone 25 mg tablet Commonly known as:  Alexys Fishman Your next dose is: Today, Tomorrow Comments:  __________ Dose:  25 mg Take 25 mg by mouth daily. Refills:  0  
     
   
   
   
  
 doxycycline 100 mg capsule Commonly known as:  VIBRAMYCIN Your next dose is: Today, Tomorrow Comments:  __________ Dose:  100 mg Take 1 Cap by mouth every twelve (12) hours for 5 days. Quantity:  10 Cap Refills:  0  
     
   
   
   
  
 fluticasone-salmeterol 250-50 mcg/dose diskus inhaler Commonly known as:  ADVAIR Your next dose is: Today, Tomorrow Comments:  __________ Dose:  1 Puff Take 1 Puff by inhalation two (2) times a day. Quantity:  1 Inhaler Refills:  1 LIPITOR 40 mg tablet Generic drug:  atorvastatin Your next dose is: Today, Tomorrow Comments:  __________ Dose:  40 mg Take 40 mg by mouth nightly. Refills:  0  
     
   
   
   
  
 metFORMIN 500 mg tablet Commonly known as:  GLUCOPHAGE Your next dose is: Today, Tomorrow Comments:  __________ Dose:  500 mg Take 500 mg by mouth two (2) times daily (with meals). Refills:  0  
     
   
   
   
  
 predniSONE 20 mg tablet Commonly known as:  Enriqueta Mons Your next dose is: Today, Tomorrow Comments:  __________ Dose:  60 mg Take 3 Tabs by mouth daily (after dinner) for 4 days. Quantity:  12 Tab Refills:  0  
     
   
   
   
  
 spironolactone 100 mg tablet Commonly known as:  ALDACTONE Your next dose is: Today, Tomorrow Comments:  __________ Dose:  100 mg Take 100 mg by mouth daily. Refills:  0 VITAMIN D2 50,000 unit capsule Generic drug:  ergocalciferol Your next dose is: Today, Tomorrow Comments:  __________ Dose:  79626 Units Take 50,000 Units by mouth every seven (7) days. Refills:  0 Take these medications as needed Dose & Instructions Dispensing Information Comments Morning Noon Evening Bedtime  
 guaiFENesin-codeine 100-10 mg/5 mL solution Commonly known as:  ROBITUSSIN AC Your next dose is: Today, Tomorrow Comments:  __________ Dose:  5 mL Take 5 mL by mouth three (3) times daily as needed for Cough. Max Daily Amount: 15 mL. Quantity:  90 mL Refills:  0  
     
   
   
   
  
 hydrOXYzine HCl 10 mg tablet Commonly known as:  ATARAX Your next dose is: Today, Tomorrow Comments:  __________ Take  by mouth every eight (8) hours as needed for Itching or Anxiety. Refills:  0 Where to Get Your Medications Information about where to get these medications is not yet available ! Ask your nurse or doctor about these medications  
  doxycycline 100 mg capsule  
 predniSONE 20 mg tablet

## 2017-03-11 NOTE — ED NOTES
Ambulated pt and SPO2 drooped to 84% while ambulating; SPO2 increased to 94% weh when pt is resting.

## 2017-03-11 NOTE — ED TRIAGE NOTES
BIBA from home for dizziness. Pt given 2 albuterol nebs by medics. Pt states that the dizziness started an hour ago when she went to stand up to go to the bathroom. Pt said she took codeine cough syrup a little while before she got up to go to the bathroom. While in the bathroom pt says her vision got blurry and she was \"out of it. \" pt is AO x 4 at this time and denies dizziness, but states he vision is a little blurry. Today pt was diagnosed with bronchitis at [facility] ER and was given the codeine cough syrup prescription there.

## 2017-03-12 PROBLEM — J45.901 ASTHMATIC BRONCHITIS WITH ACUTE EXACERBATION: Status: ACTIVE | Noted: 2017-03-12

## 2017-03-12 PROBLEM — R09.02 HYPOXIA: Status: ACTIVE | Noted: 2017-03-12

## 2017-03-12 LAB
ATRIAL RATE: 93 BPM
BACTERIA SPEC CULT: NORMAL
CALCULATED P AXIS, ECG09: 39 DEGREES
CALCULATED R AXIS, ECG10: -11 DEGREES
CALCULATED T AXIS, ECG11: 32 DEGREES
DIAGNOSIS, 93000: NORMAL
EST. AVERAGE GLUCOSE BLD GHB EST-MCNC: 120 MG/DL
GLUCOSE BLD STRIP.AUTO-MCNC: 130 MG/DL (ref 70–110)
GLUCOSE BLD STRIP.AUTO-MCNC: 142 MG/DL (ref 70–110)
GLUCOSE BLD STRIP.AUTO-MCNC: 158 MG/DL (ref 70–110)
GLUCOSE BLD STRIP.AUTO-MCNC: 165 MG/DL (ref 70–110)
HBA1C MFR BLD: 5.8 % (ref 4.2–5.6)
P-R INTERVAL, ECG05: 150 MS
Q-T INTERVAL, ECG07: 374 MS
QRS DURATION, ECG06: 92 MS
QTC CALCULATION (BEZET), ECG08: 465 MS
SERVICE CMNT-IMP: NORMAL
VENTRICULAR RATE, ECG03: 93 BPM

## 2017-03-12 PROCEDURE — 82962 GLUCOSE BLOOD TEST: CPT

## 2017-03-12 PROCEDURE — 87641 MR-STAPH DNA AMP PROBE: CPT | Performed by: HOSPITALIST

## 2017-03-12 PROCEDURE — 83036 HEMOGLOBIN GLYCOSYLATED A1C: CPT | Performed by: HOSPITALIST

## 2017-03-12 PROCEDURE — 65270000029 HC RM PRIVATE

## 2017-03-12 PROCEDURE — 94640 AIRWAY INHALATION TREATMENT: CPT

## 2017-03-12 PROCEDURE — 77030013140 HC MSK NEB VYRM -A

## 2017-03-12 PROCEDURE — 36415 COLL VENOUS BLD VENIPUNCTURE: CPT | Performed by: HOSPITALIST

## 2017-03-12 PROCEDURE — 74011636637 HC RX REV CODE- 636/637: Performed by: FAMILY MEDICINE

## 2017-03-12 PROCEDURE — 74011000250 HC RX REV CODE- 250: Performed by: HOSPITALIST

## 2017-03-12 PROCEDURE — 74011250637 HC RX REV CODE- 250/637: Performed by: HOSPITALIST

## 2017-03-12 PROCEDURE — 74011000250 HC RX REV CODE- 250: Performed by: PHYSICIAN ASSISTANT

## 2017-03-12 PROCEDURE — 74011250636 HC RX REV CODE- 250/636: Performed by: HOSPITALIST

## 2017-03-12 RX ORDER — HEPARIN SODIUM 5000 [USP'U]/ML
5000 INJECTION, SOLUTION INTRAVENOUS; SUBCUTANEOUS EVERY 8 HOURS
Status: DISCONTINUED | OUTPATIENT
Start: 2017-03-12 | End: 2017-03-13 | Stop reason: HOSPADM

## 2017-03-12 RX ORDER — MAGNESIUM SULFATE 100 %
4 CRYSTALS MISCELLANEOUS AS NEEDED
Status: DISCONTINUED | OUTPATIENT
Start: 2017-03-12 | End: 2017-03-13 | Stop reason: HOSPADM

## 2017-03-12 RX ORDER — LABETALOL 200 MG/1
200 TABLET, FILM COATED ORAL 2 TIMES DAILY
Status: DISCONTINUED | OUTPATIENT
Start: 2017-03-12 | End: 2017-03-12

## 2017-03-12 RX ORDER — ATORVASTATIN CALCIUM 40 MG/1
40 TABLET, FILM COATED ORAL
COMMUNITY

## 2017-03-12 RX ORDER — INSULIN LISPRO 100 [IU]/ML
INJECTION, SOLUTION INTRAVENOUS; SUBCUTANEOUS
Status: DISCONTINUED | OUTPATIENT
Start: 2017-03-12 | End: 2017-03-13 | Stop reason: HOSPADM

## 2017-03-12 RX ORDER — SODIUM CHLORIDE 0.9 % (FLUSH) 0.9 %
5-10 SYRINGE (ML) INJECTION AS NEEDED
Status: DISCONTINUED | OUTPATIENT
Start: 2017-03-12 | End: 2017-03-13 | Stop reason: HOSPADM

## 2017-03-12 RX ORDER — SODIUM CHLORIDE 0.9 % (FLUSH) 0.9 %
5-10 SYRINGE (ML) INJECTION EVERY 8 HOURS
Status: DISCONTINUED | OUTPATIENT
Start: 2017-03-12 | End: 2017-03-13

## 2017-03-12 RX ORDER — BISACODYL 5 MG
5 TABLET, DELAYED RELEASE (ENTERIC COATED) ORAL DAILY PRN
Status: DISCONTINUED | OUTPATIENT
Start: 2017-03-12 | End: 2017-03-13 | Stop reason: HOSPADM

## 2017-03-12 RX ORDER — ERGOCALCIFEROL 1.25 MG/1
50000 CAPSULE ORAL
Status: DISCONTINUED | OUTPATIENT
Start: 2017-03-12 | End: 2017-03-12

## 2017-03-12 RX ORDER — IPRATROPIUM BROMIDE 0.5 MG/2.5ML
0.5 SOLUTION RESPIRATORY (INHALATION)
Status: DISCONTINUED | OUTPATIENT
Start: 2017-03-12 | End: 2017-03-13 | Stop reason: HOSPADM

## 2017-03-12 RX ORDER — ALBUTEROL SULFATE 5 MG/ML
2.5 SOLUTION RESPIRATORY (INHALATION)
Status: CANCELLED | OUTPATIENT
Start: 2017-03-12

## 2017-03-12 RX ORDER — DEXTROSE 50 % IN WATER (D50W) INTRAVENOUS SYRINGE
25-50 AS NEEDED
Status: DISCONTINUED | OUTPATIENT
Start: 2017-03-12 | End: 2017-03-13 | Stop reason: HOSPADM

## 2017-03-12 RX ORDER — FUROSEMIDE 20 MG/1
20 TABLET ORAL 2 TIMES DAILY
Status: DISCONTINUED | OUTPATIENT
Start: 2017-03-12 | End: 2017-03-12

## 2017-03-12 RX ORDER — ALBUTEROL SULFATE 0.83 MG/ML
2.5 SOLUTION RESPIRATORY (INHALATION)
Status: DISCONTINUED | OUTPATIENT
Start: 2017-03-12 | End: 2017-03-13 | Stop reason: HOSPADM

## 2017-03-12 RX ORDER — BISACODYL 5 MG
5 TABLET, DELAYED RELEASE (ENTERIC COATED) ORAL DAILY PRN
Status: CANCELLED | OUTPATIENT
Start: 2017-03-12

## 2017-03-12 RX ORDER — BUDESONIDE AND FORMOTEROL FUMARATE DIHYDRATE 160; 4.5 UG/1; UG/1
2 AEROSOL RESPIRATORY (INHALATION)
Status: DISCONTINUED | OUTPATIENT
Start: 2017-03-12 | End: 2017-03-13 | Stop reason: HOSPADM

## 2017-03-12 RX ORDER — HYDROXYZINE HYDROCHLORIDE 10 MG/1
TABLET, FILM COATED ORAL
COMMUNITY

## 2017-03-12 RX ORDER — CODEINE PHOSPHATE AND GUAIFENESIN 10; 100 MG/5ML; MG/5ML
5 SOLUTION ORAL
Status: DISCONTINUED | OUTPATIENT
Start: 2017-03-12 | End: 2017-03-12

## 2017-03-12 RX ORDER — PREDNISONE 20 MG/1
60 TABLET ORAL
Status: DISCONTINUED | OUTPATIENT
Start: 2017-03-12 | End: 2017-03-13 | Stop reason: HOSPADM

## 2017-03-12 RX ORDER — AMLODIPINE BESYLATE 10 MG/1
10 TABLET ORAL DAILY
Status: DISCONTINUED | OUTPATIENT
Start: 2017-03-12 | End: 2017-03-12

## 2017-03-12 RX ORDER — SPIRONOLACTONE 100 MG/1
100 TABLET, FILM COATED ORAL DAILY
COMMUNITY

## 2017-03-12 RX ORDER — SODIUM CHLORIDE 0.9 % (FLUSH) 0.9 %
5-10 SYRINGE (ML) INJECTION AS NEEDED
Status: CANCELLED | OUTPATIENT
Start: 2017-03-12

## 2017-03-12 RX ORDER — SODIUM CHLORIDE 0.9 % (FLUSH) 0.9 %
5-10 SYRINGE (ML) INJECTION EVERY 8 HOURS
Status: CANCELLED | OUTPATIENT
Start: 2017-03-12

## 2017-03-12 RX ORDER — BENZONATATE 100 MG/1
100 CAPSULE ORAL
Status: DISCONTINUED | OUTPATIENT
Start: 2017-03-12 | End: 2017-03-13 | Stop reason: HOSPADM

## 2017-03-12 RX ORDER — CARVEDILOL 25 MG/1
25 TABLET ORAL 2 TIMES DAILY WITH MEALS
COMMUNITY
End: 2017-03-13

## 2017-03-12 RX ORDER — HEPARIN SODIUM 5000 [USP'U]/ML
5000 INJECTION, SOLUTION INTRAVENOUS; SUBCUTANEOUS EVERY 8 HOURS
Status: CANCELLED | OUTPATIENT
Start: 2017-03-12

## 2017-03-12 RX ORDER — BENAZEPRIL HYDROCHLORIDE 10 MG/1
20 TABLET ORAL DAILY
Status: DISCONTINUED | OUTPATIENT
Start: 2017-03-12 | End: 2017-03-12

## 2017-03-12 RX ORDER — METFORMIN HYDROCHLORIDE 500 MG/1
500 TABLET ORAL 2 TIMES DAILY WITH MEALS
COMMUNITY

## 2017-03-12 RX ORDER — CHLORTHALIDONE 25 MG/1
25 TABLET ORAL DAILY
COMMUNITY

## 2017-03-12 RX ORDER — DOXYCYCLINE 100 MG/1
100 CAPSULE ORAL EVERY 12 HOURS
Status: DISCONTINUED | OUTPATIENT
Start: 2017-03-12 | End: 2017-03-13 | Stop reason: HOSPADM

## 2017-03-12 RX ORDER — IPRATROPIUM BROMIDE 0.5 MG/2.5ML
0.5 SOLUTION RESPIRATORY (INHALATION)
Status: CANCELLED | OUTPATIENT
Start: 2017-03-12

## 2017-03-12 RX ADMIN — ALBUTEROL SULFATE 2.5 MG: 2.5 SOLUTION RESPIRATORY (INHALATION) at 16:00

## 2017-03-12 RX ADMIN — METHYLPREDNISOLONE SODIUM SUCCINATE 40 MG: 40 INJECTION, POWDER, FOR SOLUTION INTRAMUSCULAR; INTRAVENOUS at 08:29

## 2017-03-12 RX ADMIN — BUDESONIDE AND FORMOTEROL FUMARATE DIHYDRATE 2 PUFF: 160; 4.5 AEROSOL RESPIRATORY (INHALATION) at 15:10

## 2017-03-12 RX ADMIN — Medication 10 ML: at 05:37

## 2017-03-12 RX ADMIN — DOXYCYCLINE HYCLATE 100 MG: 100 CAPSULE ORAL at 08:28

## 2017-03-12 RX ADMIN — Medication 10 ML: at 22:03

## 2017-03-12 RX ADMIN — BENZONATATE 100 MG: 100 CAPSULE ORAL at 08:28

## 2017-03-12 RX ADMIN — ALBUTEROL SULFATE 2.5 MG: 2.5 SOLUTION RESPIRATORY (INHALATION) at 13:25

## 2017-03-12 RX ADMIN — HEPARIN SODIUM 5000 UNITS: 5000 INJECTION, SOLUTION INTRAVENOUS; SUBCUTANEOUS at 13:29

## 2017-03-12 RX ADMIN — Medication 10 ML: at 15:08

## 2017-03-12 RX ADMIN — INSULIN LISPRO 2 UNITS: 100 INJECTION, SOLUTION INTRAVENOUS; SUBCUTANEOUS at 13:28

## 2017-03-12 RX ADMIN — PREDNISONE 60 MG: 20 TABLET ORAL at 17:31

## 2017-03-12 RX ADMIN — BUDESONIDE AND FORMOTEROL FUMARATE DIHYDRATE 2 PUFF: 160; 4.5 AEROSOL RESPIRATORY (INHALATION) at 21:11

## 2017-03-12 RX ADMIN — IPRATROPIUM BROMIDE AND ALBUTEROL SULFATE 3 ML: .5; 3 SOLUTION RESPIRATORY (INHALATION) at 00:50

## 2017-03-12 RX ADMIN — HEPARIN SODIUM 5000 UNITS: 5000 INJECTION, SOLUTION INTRAVENOUS; SUBCUTANEOUS at 05:35

## 2017-03-12 RX ADMIN — ALBUTEROL SULFATE 2.5 MG: 2.5 SOLUTION RESPIRATORY (INHALATION) at 21:11

## 2017-03-12 RX ADMIN — INSULIN LISPRO 2 UNITS: 100 INJECTION, SOLUTION INTRAVENOUS; SUBCUTANEOUS at 08:15

## 2017-03-12 RX ADMIN — DOXYCYCLINE HYCLATE 100 MG: 100 CAPSULE ORAL at 22:03

## 2017-03-12 NOTE — ROUTINE PROCESS
Primary Nurse Dennis Cuevas [facility], [address], RN performed a dual skin assessment on this patient No impairment noted  Eduard score is 23

## 2017-03-12 NOTE — H&P
[address] [facility]  ROUTINE H AND PS    Name:  Helen Fong  MR#:  [id number]  :  1980  Account #:  [de-identified]  Date of Adm:  2017      CHIEF COMPLAINT: Shortness of breath. HISTORY OF PRESENT ILLNESS: The patient is a 39-year-old  morbidly obese black female, who has a history of asthma and  hypertension, and presented to [facility] emergency  room after she developed symptoms of dizziness, lightheadedness  after taking Robitussin cough syrup with codeine. She had been to  [facility] where she and her daughter were diagnosed  with having bronchitis and discharged on Robitussin with codeine. She  had worsening cough and was sitting on the commode, felt hot and  dry, drank water, appeared to have nodded out, and came back  around after around 20 minutes, back to her baseline. It is not clear  that she lost consciousness, but her family members brought her a fan,  and for 20 minutes she was not herself. In the emergency room, she  was given treatments for asthma exacerbation as she had some  hypoxia with O2 saturations down in the 80s. Because she had  persistent hypoxia, she had further evaluation with a VQ scan to rule  out PE, and the results were low probability for PE. She was given  Solu-Medrol, 2 grams of magnesium, and then was referred to the  hospitalist team for admission. PAST MEDICAL HISTORY: Significant for hypertension, anxiety,  asthma. She was hospitalized for asthma 2 years ago. She has  diabetes mellitus type 2 and hypertension. ALLERGIES: ALLERGIC TO ASPIRIN. MEDICATIONS  Medications that she was taking prior to this admission include:  1. Robitussin AC, that is guaifenesin with codeine, 5 ml by mouth 3  times daily as needed for cough. 2. Lotensin 20 mg p.o. daily. 3. Ergocalciferol, vitamin D2, 50,000 units every 7 days. 4. Amlodipine 10 mg p.o. daily. 5. Advair 250/50 mcg per dose 1 puff inhaled twice daily.   6. Normodyne, or labetalol, 200 mg tablet p.o. twice daily. 7. Lasix 20 mg p.o. by mouth twice daily. FAMILY HISTORY: She states no other family members have asthma,  but she states her daughter recently had flu-like symptoms and was  diagnosed with bronchitis, as she was, at [facility].    SOCIAL HISTORY: She is single. Her daughter is her . Her name is Chris Dunbar, her phone number is [phone]. She  denies ongoing tobacco use. She states she quit alcohol. She states  she quit smoking tobacco back in 2016 and smoked on and off for  about 6 years prior to quitting. She denies any IV drug abuse history. She is FULL CODE status. REVIEW OF SYSTEMS: She states that she is breathing better. She  states that she continues to have some dyspnea and says she feels  wheezy. She denies any chest pain, shortness of breath. She has had  cough and at times intractable cough. No nausea, vomiting, diarrhea. No melenic stool or bright red blood per rectum. No dysuria or gross  hematuria. She has not had any subjective fevers, shaking chills, or  rigors. She has had mostly dry cough. Her remaining 14-point review  of systems is negative. PHYSICAL EXAMINATION  GENERAL: She is a morbidly obese black female. She appears her  stated age. She is in no distress. VITAL SIGNS: Her temperature was 98.4, pulse 82, respirations were  18, blood pressure was 135/83, O2 saturations 98% on 2 liters. Her  weight was recorded as 419 pounds, 5 feet 5 inches in height. SKIN: Without jaundice or pallor. HEENT: She is normocephalic, atraumatic. Pupils equal, round, react  to light and accommodation. Sclerae are anicteric. Oral cavity is moist.  NECK: Supple. No JVD, carotid bruits, or goiter. Trachea was midline. LUNGS: Breath sounds bilaterally. No wheezing or rales. No areas of  consolidation. No dullness on percussion. HEART: Regular rate and rhythm. No S3 gallop, murmur, or rub. ABDOMEN: Protuberant.  There is no hepatosplenomegaly or ascites. Probably some tenderness on deep palpation that is generalized. There is no rebound tenderness. Her bowel sounds were present and  normoactive. BACK: Midline spine. No CVA or point tenderness. EXTREMITIES: No clubbing, cyanosis, or edema. She moved all  extremities. She had dorsalis pedis pulses palpable bilaterally. There  were no palpable cords. Capillary refill time was within normal limits. NEUROLOGICAL: She was alert and oriented x3. Speech had a  regular rate and rhythm. Her mood and affect were unremarkable. LABORATORY DATA: Her white count was 5.7, hemoglobin was 13.8,  hematocrit was 43.4, platelets were 796. Her sodium was 135,  potassium 3.8, chloride was 94, CO2 was 34, BUN 17, creatinine was  1.94, glucose was 92, calcium was 8.8. Magnesium was 1.7. Her chest x-ray showed low lung volumes with likely streaky  atelectasis in the right lung base, and lingular early infiltrate are not  entirely excluded. IMPRESSION AND PLAN  1. Acute asthma exacerbation. The patient will be continued on  bronchodilators. Will treat with IV steroids with Solu-Medrol at 40 mg  q.12 hours and will provide oxygen as needed. 2. Acute bronchitis with difficulty excluding early infiltrates in the left  lingula. Will treat the patient empirically with doxycycline 100 mg twice  daily. Will attempt to collect sputum for Gram stain, culture, and  sensitivity. 3. Atelectasis and hypoxia. Will administer O2 via nasal cannula and  will order incentive spirometer to be administered by nursing staff q.1  hour while the patient is awake. 4. Morbid obesity with probable obesity hypoventilation syndrome  and possibly some obstructive sleep apnea. The patient should have  formal pulmonary function tests done in the outpatient setting. The  patient might benefit from nocturnal CPAP if she is indeed diagnosed  with obstructive sleep apnea, which I believe is likely.   5. Diabetes mellitus type 2. Will maintain the patient on a moderate  carbohydrate diet. Will administer Humalog insulin via the Coinex-IO  sliding scale standing orders p.r.n.         MD Bal Niño / MUKESH  D:  03/12/2017   04:37  T:  03/12/2017   06:23  Job #:  [id number]

## 2017-03-12 NOTE — ED NOTES
TRANSFER - OUT REPORT:    Verbal report given to VANESSA okeefe(name) on [employer]  being transferred to [address] [place] [address](unit) for routine progression of care       Report consisted of patients Situation, Background, Assessment and   Recommendations(SBAR). Information from the following report(s) SBAR was reviewed with the receiving nurse. Lines:   Peripheral IV 06/08/16 Right Antecubital (Active)       Peripheral IV 03/11/17 Left Antecubital (Active)   Site Assessment Clean, dry, & intact 3/11/2017  7:03 PM   Phlebitis Assessment 0 3/11/2017  7:03 PM   Infiltration Assessment 0 3/11/2017  7:03 PM   Dressing Status Clean, dry, & intact 3/11/2017  7:03 PM   Dressing Type Transparent 3/11/2017  7:03 PM   Hub Color/Line Status Patent 3/11/2017  7:03 PM   Action Taken Blood drawn 3/11/2017  7:03 PM        Opportunity for questions and clarification was provided.       Patient transported with:   Monitor  Registered Nurse

## 2017-03-12 NOTE — ROUTINE PROCESS
Received patient from [address] [place] [address], alert oriented x4 o2 2liters nasal canula. respiatort to room for [facility], patient talking on phone sitting up in chair.

## 2017-03-12 NOTE — PROGRESS NOTES
[facility] Hospitalist Group  Progress Note    Patient: Carmine Barron Age: 44 y.o. : 1980 MR#: [id number] SSN: [id number]  Date/Time: 3/12/2017 3:08 PM    Subjective:     Denies F/C, N/V, CP. Mildly PICKARD. No other c/o. No dizzy/lightheadedness. Reports that after she went home from [facility], she was extremely tired (has had very little sleep), took her Robitussin with codeine as well as her BP meds, and slept. She denies losing consciousness. Reports that she rarely ever has to use her Albuterol. Only uses it when she gets sick with a cold, approximately once every 4-6 months. Assessment/Plan:   1. Acute asthma exacerbation - mild intermittent asthma. Improved, no wheezing, will transition steroids to PO. Wean O2 as tolerated, ambulate. 2. Acute bronchitis - empiric abx. 3. Hypoxia - with ambulation. Due to above. Ambulate and recheck. 4. HUNTER/OHS - reports having CPAP a couple of years ago, which broke. Has tried to get another machine without success. 5. DM - SSI. HgbA1c of 5.8%. 6. Aim for d/c home tomorrow with continued improvement. Additional Notes:      Case discussed with:  [x]Patient  [x]Family  []Nursing  []Case Management  DVT Prophylaxis:  []Lovenox  [x]Hep SQ  []SCDs  []Coumadin   []On Heparin gtt    Objective:   VS:   Visit Vitals    /74 (BP 1 Location: Left arm, BP Patient Position: At rest)    Pulse 82    Temp 98.3 °F (36.8 °C)    Resp 20    Ht 5' 5\" (1.651 m)    Wt (!) 190.1 kg (419 lb)    SpO2 94%    Breastfeeding No    BMI 69.73 kg/m2      Tmax/24hrs: Temp (24hrs), Av.3 °F (36.8 °C), Min:97.8 °F (36.6 °C), Max:98.8 °F (37.1 °C)  No intake or output data in the 24 hours ending 17 1508    General:  Awake, alert, NAD. Cardiovascular:  RRR. Pulmonary:  CTA B.  GI:  Soft, obese, NT/ND, NABS. Extremities:  No CT or edema.    Additional:      Labs:    Recent Results (from the past 24 hour(s))   D DIMER    Collection Time: 03/11/17  3:59 PM   Result Value Ref Range    D DIMER 0.43 <0.46 ug/ml(FEU)   PRO-BNP    Collection Time: 03/11/17  3:59 PM   Result Value Ref Range    NT pro-BNP 34 0 - 450 PG/ML   URINALYSIS W/ RFLX MICROSCOPIC    Collection Time: 03/11/17  6:45 PM   Result Value Ref Range    Color YELLOW      Appearance CLEAR      Specific gravity 1.020 1.003 - 1.030      pH (UA) 5.5 5.0 - 8.0      Protein 100 (A) NEG mg/dL    Glucose NEGATIVE  NEG mg/dL    Ketone 15 (A) NEG mg/dL    Bilirubin SMALL (A) NEG      Blood NEGATIVE  NEG      Urobilinogen 1.0 0.2 - 1.0 EU/dL    Nitrites NEGATIVE  NEG      Leukocyte Esterase NEGATIVE  NEG     HCG URINE, QL    Collection Time: 03/11/17  6:45 PM   Result Value Ref Range    HCG urine, Ql. NEGATIVE  NEG     URINE MICROSCOPIC ONLY    Collection Time: 03/11/17  6:45 PM   Result Value Ref Range    WBC NONE 0 - 4 /hpf    RBC NONE 0 - 5 /hpf    Epithelial cells FEW 0 - 5 /lpf    Bacteria FEW (A) NEG /hpf   CBC WITH AUTOMATED DIFF    Collection Time: 03/11/17  6:59 PM   Result Value Ref Range    WBC 5.7 4.6 - 13.2 K/uL    RBC 4.70 4.20 - 5.30 M/uL    HGB 13.8 12.0 - 16.0 g/dL    HCT 43.4 35.0 - 45.0 %    MCV 92.3 74.0 - 97.0 FL    MCH 29.4 24.0 - 34.0 PG    MCHC 31.8 31.0 - 37.0 g/dL    RDW 12.9 11.6 - 14.5 %    PLATELET 503 269 - 783 K/uL    MPV 9.4 9.2 - 11.8 FL    NEUTROPHILS 54 40 - 73 %    LYMPHOCYTES 20 (L) 21 - 52 %    MONOCYTES 25 (H) 3 - 10 %    EOSINOPHILS 1 0 - 5 %    BASOPHILS 0 0 - 2 %    ABS. NEUTROPHILS 3.1 1.8 - 8.0 K/UL    ABS. LYMPHOCYTES 1.1 0.9 - 3.6 K/UL    ABS. MONOCYTES 1.4 (H) 0.05 - 1.2 K/UL    ABS. EOSINOPHILS 0.0 0.0 - 0.4 K/UL    ABS.  BASOPHILS 0.0 0.0 - 0.06 K/UL    DF AUTOMATED     METABOLIC PANEL, COMPREHENSIVE    Collection Time: 03/11/17  6:59 PM   Result Value Ref Range    Sodium 135 (L) 136 - 145 mmol/L    Potassium 3.8 3.5 - 5.5 mmol/L    Chloride 94 (L) 100 - 108 mmol/L    CO2 34 (H) 21 - 32 mmol/L    Anion gap 7 3.0 - 18 mmol/L    Glucose 92 74 - 99 mg/dL BUN 17 7.0 - 18 MG/DL    Creatinine 1.94 (H) 0.6 - 1.3 MG/DL    BUN/Creatinine ratio 9 (L) 12 - 20      GFR est AA 35 (L) >60 ml/min/1.73m2    GFR est non-AA 29 (L) >60 ml/min/1.73m2    Calcium 8.8 8.5 - 10.1 MG/DL    Bilirubin, total 0.3 0.2 - 1.0 MG/DL    ALT (SGPT) 35 13 - 56 U/L    AST (SGOT) 37 15 - 37 U/L    Alk.  phosphatase 60 45 - 117 U/L    Protein, total 8.2 6.4 - 8.2 g/dL    Albumin 3.6 3.4 - 5.0 g/dL    Globulin 4.6 (H) 2.0 - 4.0 g/dL    A-G Ratio 0.8 0.8 - 1.7     MAGNESIUM    Collection Time: 03/11/17  6:59 PM   Result Value Ref Range    Magnesium 1.7 (L) 1.8 - 2.4 mg/dL   CARDIAC PANEL,(CK, CKMB & TROPONIN)    Collection Time: 03/11/17  6:59 PM   Result Value Ref Range     (H) 26 - 192 U/L    CK - MB 1.7 <3.6 ng/ml    CK-MB Index 0.3 0.0 - 4.0 %    Troponin-I, Qt. <0.02 0.0 - 0.045 NG/ML   HCG QL SERUM    Collection Time: 03/11/17  6:59 PM   Result Value Ref Range    HCG, Ql. NEGATIVE  NEG     EKG, 12 LEAD, INITIAL    Collection Time: 03/11/17  6:59 PM   Result Value Ref Range    Ventricular Rate 93 BPM    Atrial Rate 93 BPM    P-R Interval 150 ms    QRS Duration 92 ms    Q-T Interval 374 ms    QTC Calculation (Bezet) 465 ms    Calculated P Axis 39 degrees    Calculated R Axis -11 degrees    Calculated T Axis 32 degrees    Diagnosis       Normal sinus rhythm  Cannot rule out Anterior infarct , age undetermined  Abnormal ECG  When compared with ECG of 08-JUN-2016 17:03,  No significant change was found  Confirmed by Woody Lyles MD, Eric ([id number]) on 3/12/2017 8:36:31 AM     INFLUENZA A & B AG (RAPID TEST)    Collection Time: 03/11/17  7:20 PM   Result Value Ref Range    Influenza A Antigen NEGATIVE  NEG      Influenza B Antigen NEGATIVE  NEG     POC G3    Collection Time: 03/11/17 11:10 PM   Result Value Ref Range    Device: NASAL CANNULA      Flow rate (POC) 2.0 L/M    pH (POC) 7.342 (L) 7.35 - 7.45      pCO2 (POC) 52.0 (H) 35.0 - 45.0 MMHG    pO2 (POC) 72 (L) 80 - 100 MMHG    HCO3 (POC) 28.2 (H) 22 - 26 MMOL/L    sO2 (POC) 93 92 - 97 %    Base excess (POC) 1 mmol/L    Allens test (POC) YES      Total resp.  rate 20      Site LEFT RADIAL      Specimen type (POC) ARTERIAL      Performed by Ginette NOVAK WITH EAG    Collection Time: 03/12/17  5:19 AM   Result Value Ref Range    Hemoglobin A1c 5.8 (H) 4.2 - 5.6 %    Est. average glucose 120 mg/dL   GLUCOSE, POC    Collection Time: 03/12/17  7:48 AM   Result Value Ref Range    Glucose (POC) 158 (H) 70 - 110 mg/dL   MRSA SCREEN - PCR (NASAL)    Collection Time: 03/12/17  7:50 AM   Result Value Ref Range    Special Requests: NO SPECIAL REQUESTS      Culture result:        MRSA target DNA is not detected (presumptive not colonized with MRSA)   GLUCOSE, POC    Collection Time: 03/12/17 11:51 AM   Result Value Ref Range    Glucose (POC) 165 (H) 70 - 110 mg/dL       Signed By: Marisela Pollokc MD     March 12, 2017 3:08 PM

## 2017-03-12 NOTE — PROGRESS NOTES
TRANSFER - OUT REPORT:    Verbal report given to Yen RN(name) on [employer]  being transferred to [facility] (unit) for routine progression of care       Report consisted of patients Situation, Background, Assessment and   Recommendations(SBAR). Information from the following report(s) SBAR, Kardex and MAR was reviewed with the receiving nurse. Lines:   Peripheral IV 06/08/16 Right Antecubital (Active)       Peripheral IV 03/11/17 Left Antecubital (Active)   Site Assessment Clean, dry, & intact 3/12/2017  2:20 AM   Phlebitis Assessment 0 3/12/2017  2:20 AM   Infiltration Assessment 0 3/12/2017  2:20 AM   Dressing Status Clean, dry, & intact 3/12/2017  2:20 AM   Dressing Type Transparent 3/12/2017  2:20 AM   Hub Color/Line Status Flushed 3/12/2017  2:20 AM   Action Taken Blood drawn 3/11/2017  7:03 PM        Opportunity for questions and clarification was provided.       Patient transported with:   O2 @ 2 liters  Tech

## 2017-03-12 NOTE — ROUTINE PROCESS
TRANSFER - IN REPORT:    Verbal report received from Serenity(name) on Willam Sandoval  being received from ED(unit) for routine progression of care      Report consisted of patients Situation, Background, Assessment and   Recommendations(SBAR). Information from the following report(s) SBAR, Kardex, ED Summary, [facility] and Recent Results was reviewed with the receiving nurse. Opportunity for questions and clarification was provided. Assessment completed upon patients arrival to unit and care assumed.

## 2017-03-12 NOTE — ED PROVIDER NOTES
HPI Comments: 44 yr old female, hx htn, asthma, and anxiety presents to the ED complaining of dizziness and lightheadedness after taking robitussin with codeine PTA. Pt reports being seen at [facility] earlier today for a cough that she has had for several days. Pt had a chest x-ray that was negative and was diagnosed with bronchitis. Pt was prescribed robitussin AC. Pt states she has inhalers at home but denies using them. Reports a long hx of PICKARD but denies any change in these sx. Pt denies SOB at present. Denies chest pain, abdominal sx, urinary sx, fever, chills, leg swelling, recent travel, and hx DVT. No other complaints. Patient is a 44 y.o. female presenting with dizziness. Dizziness    Associated symptoms include dizziness and light-headedness. Pertinent negatives include no chest pain, no palpitations, no diaphoresis, no fever, no abdominal pain, no nausea, no vomiting, no congestion, no headaches, no back pain, no seizures and no weakness. Her past medical history does not include no syncope. Past Medical History:   Diagnosis Date    Anxiety     Asthma     HTN (hypertension)        History reviewed. No pertinent surgical history. Family History:   Problem Relation Age of Onset    Stroke Mother     Hypertension Mother     Hypertension Father        Social History     Social History    Marital status: SINGLE     Spouse name: N/A    Number of children: N/A    Years of education: N/A     Occupational History    Not on file. Social History Main Topics    Smoking status: Never Smoker    Smokeless tobacco: Not on file    Alcohol use Yes      Comment: socially    Drug use: Not on file    Sexual activity: Not on file     Other Topics Concern    Not on file     Social History Narrative         ALLERGIES: Aspirin    Review of Systems   Constitutional: Negative for chills, diaphoresis, fatigue and fever.    HENT: Negative for congestion, ear pain, rhinorrhea and sore throat. Eyes: Negative for pain and redness. Respiratory: Positive for cough and shortness of breath. Negative for wheezing and stridor. SOB on exertion, chronic   Cardiovascular: Negative for chest pain, palpitations and leg swelling. Gastrointestinal: Negative for abdominal pain, constipation, diarrhea, nausea and vomiting. Genitourinary: Negative for dysuria, flank pain, frequency and hematuria. Musculoskeletal: Negative for back pain, myalgias, neck pain and neck stiffness. Skin: Negative for rash and wound. Neurological: Positive for dizziness and light-headedness. Negative for seizures, syncope, weakness, numbness and headaches. All other systems reviewed and are negative. Vitals:    03/11/17 2100 03/11/17 2218 03/11/17 2230 03/11/17 2300   BP: 146/80 138/68 138/73 132/71   Pulse:       Resp:       Temp:       SpO2: 93% 95% 94% 94%   Weight:       Height:                Physical Exam   Constitutional: She is oriented to person, place, and time. She appears well-developed and well-nourished. She appears distressed. Morbidly obese, mildly distressed in the exam room   HENT:   Head: Normocephalic. Right Ear: External ear normal.   Left Ear: External ear normal.   Nose: Nose normal.   Mouth/Throat: Oropharynx is clear and moist. No oropharyngeal exudate. Eyes: Conjunctivae and EOM are normal. Pupils are equal, round, and reactive to light. Right eye exhibits no discharge. Left eye exhibits no discharge. No scleral icterus. Neck: Normal range of motion. Neck supple. Cardiovascular: Normal rate, regular rhythm and normal heart sounds. Exam reveals no gallop and no friction rub. No murmur heard. Pulmonary/Chest: Effort normal and breath sounds normal. No stridor. No respiratory distress. She has no wheezes. She has no rales. Musculoskeletal: Normal range of motion. Neurological: She is alert and oriented to person, place, and time. She exhibits normal muscle tone. Coordination normal.   Gait is steady. Able to ambulate without difficulty. Skin: Skin is warm and dry. No rash noted. She is not diaphoretic. No erythema. Psychiatric: She has a normal mood and affect. Her behavior is normal. Thought content normal.   Nursing note and vitals reviewed. MDM  Number of Diagnoses or Management Options  Diagnosis management comments: Impression:  Hypoxia, dizziness    IV inserted 2 g mag sulfate, duo neb and 125 mg solumedrol given    Influenza A/B negative  ABM 1.4, monos 25, lymph 20, Na 135, Cl 94, CO2 34, Cr 1.94, BUCR 9, GFRAA 35, GFRNA 29, GLOB 4.6, Mg 1.7, CPk 525, cardiac panel otherwise negative    UA: 100 protein, 15 ketones, small bilu, few bacteria, urine hcg negative, BNP 34, D dimer 0.43,   Chest x-ray: 1. Low lung volumes with likely streaky atelectasis in the right lung base and  lingula. Early infiltrates are not entirely excluded. EKG: normal sinus rhythm, rate 93, no STEMI, study reviewed by myself and Dr. Jeison London    Pt noted to be hypoxic upon ambulation, sats in the mid 80's. Pt moved to the main side ED and placed on 2 L NC. Consulted with Dr. Jeison London who recommends obtaining ABGs and VQ scan    Progress: VQ scan: 1. Low probability VQ scan, with no segmental or larger perfusion defects.  -Significantly heterogeneous ventilatory distribution as above. ABG: pH 7.342, PCO2 52, P02 72, HCO3 28.2    Pt ambulated again in the ED, O2 80% on ambulation    Spoke with Dr. Guerrero who agrees to accept the pt for admission. Pt is stable for admission.  Iliana Guerin PA-C 12:02 AM        Amount and/or Complexity of Data Reviewed  Clinical lab tests: reviewed and ordered  Tests in the radiology section of CPT®: ordered and reviewed    Risk of Complications, Morbidity, and/or Mortality  Presenting problems: moderate  Diagnostic procedures: moderate  Management options: moderate    Patient Progress  Patient progress: stable    ED Course       Procedures

## 2017-03-12 NOTE — ROUTINE PROCESS
Patient: Jerome Sharpe Age: 44 y.o. Sex: female     Bedside and Verbal shift change report given to Lauren (oncoming nurse) by Agnes Aviles (offgoing nurse). Report included the following information SBAR, Kardex, MAR and Recent Results. PATIENT GOALS FOR TODAY PATIENT PRIORITIES FOR TODAY   Goals are: Monitor O2 with ambulation  Updated on Whiteboard in Patients Room: no   Patient states his/her priorities are: Home  Updated on Whiteboard in Patients Room: no     SITUATION:   Code Status: Full Code Reason for Admission: Hypoxia  Asthmatic bronchitis with acute exacerbation   Hospital day: 0 Problem List: Active Problems:    Hypoxia (3/12/2017)      Asthmatic bronchitis with acute exacerbation (3/12/2017)       Attending Provider:   Renae Torres MD Allergies: Allergies   Allergen Reactions    Aspirin Nausea and Vomiting and Swelling      BACKGROUND:   Past Medical History:   Past Medical History:   Diagnosis Date    Anxiety     Asthma     HTN (hypertension)      ASSESSMENT:   Neuro:   ,   CV:  Patient on Telemetry: Cardiac/Telemetry Monitor On: Yes    Box Number: [id number]   Cardiac Rhythm: Normal sinus rhythm  Patient has a pace maker:   Endocrine   Recent Glucose Results:   Lab Results   Component Value Date/Time    GLU 92 03/11/2017 06:59 PM        Respiratory:  O2 Device: Nasal cannula       Supplemental O2 O2 Flow Rate (L/min): 2 l/min       Incentive Spirometery          GI  Current diet: DIET DIABETIC CONSISTENT CARB Regular            Abdominal Assessment: Obese, Pannus, Intact, Soft               Bowel Sounds: Active            Reasons if patient has a frias: . Patient Safety  Restraints:    Family notified:    Other Alternatives: Fall Risk   Total Score: 0   Safety Measures: Bed/Chair-Wheels locked, Bed in low position, Call light within reach, Fall prevention (comment), Gripper socks VTE Prophylaxis                WOUND (if present)  Wound Type: Nyasia Gearing   Dressing present Dressing Present : No  Wound Concerns/Notes: . IV ACCESS     Reasons if patient has a central line: . PAIN  Pain Assessment  Pain Intensity 1: 0 (03/12/17 0400)        Patient Stated Pain Goal: 0  Time of last intervention: .   Reassessment Completed: no Last Vitals:  Vitals:    03/12/17 0027 03/12/17 0030 03/12/17 0036 03/12/17 0220   BP: (!) 134/99 118/73  135/83   Pulse:    82   Resp:    18   Temp:   98.2 °F (36.8 °C) 98.4 °F (36.9 °C)   SpO2: 93% 94%  98%   Weight:       Height:          Last 3 Weights:  Last 3 Recorded Weights in this Encounter    03/11/17 1805   Weight: (!) 190.1 kg (419 lb)     Weight change:   LAB RESULTS  Recent Labs      03/11/17   1859   WBC  5.7   HGB  13.8   HCT  43.4   PLT  294     Recent Labs      03/11/17   1859   NA  135*   K  3.8   GLU  92   BUN  17   CREA  1.94*   CA  8.8   MG  1.7*      RECOMMENDATIONS AND DISCHARGE PLANNING   1. Discharge plan for patient // Needs or barriers to disharge: Home . 2. Estimated Discharge Date: TBD Posted on Whiteboard in Patients Room: no    \"HEALS\" SAFETY CHECK   A safety check occurred in the patient's room between off going nurse and oncoming nurse listed above. The safety check included the below items  Area Items   H  High Alert Meds  - Verify all high alert medication drips (heparin, PCA, etc.)   E  Equipment - Suction is set up for ALL patients (with yanker)  - Red plugs utilized for all equipment (IV pumps, etc.)  - WOWs wiped down at end of shift.  - Room stocked with oxygen, suction, and other unit-specific supplies   A  Alarms - Bed alarm is set for fall risk patients  - Ensure chair alarm is in place and activated if patient is up in a chair   L  Lines - Check IV for any infiltration  - Aguayo bag is empty if patient has a Aguayo   - Tubing and IV bags are labeled   S  Safety   - Room is clean, patient is clean, and equipment is clean. - Ensure room is clear and free of clutter  - Hallways are clear from equipment besides carts.    - Fall bracelet on for fall risk patients  - Suction is set up for ALL patients (with nori)  - Isolation precautions followed, supplies available outside room, sign posted   Elvie Cleary

## 2017-03-12 NOTE — ED NOTES
Patient resting comfortably on stretcher, states she's still feeling SOB. Dizziness has subsided. Vital signs stable at this time. Will continue to monitor patient.

## 2017-03-12 NOTE — ED NOTES
Patient return from nuclear medicine, states she's feeling comfortable. Denies any new complaints at this time. Awaiting further orders from provider. No acute distress noted.

## 2017-03-12 NOTE — ROUTINE PROCESS
Bedside and Verbal shift change report given to Diane Padilla rn (oncoming nurse) by Darrell Lanier RN (offgoing nurse). Report included the following information SBAR, Kardex, MAR and Recent Results.     SITUATION:    Code Status: Full Code   Reason for Admission: Hypoxia   Asthmatic bronchitis with acute exacerbation    [address] [facility] day: 0   Problem List:       Hospital Problems  Date Reviewed: 12/19/2012          Codes Class Noted POA    Hypoxia ICD-10-CM: R09.02  ICD-9-CM: 799.02  3/12/2017 Unknown        Asthmatic bronchitis with acute exacerbation ICD-10-CM: J45.901  ICD-9-CM: 493.92  3/12/2017 Unknown              BACKGROUND:    Past Medical History:   Past Medical History:   Diagnosis Date    Anxiety     Asthma     HTN (hypertension)          Patient taking anticoagulants yes     ASSESSMENT:    Changes in Assessment Throughout Shift:      Patient has Central Line: no Reasons if yes:    Patient has Aguayo Cath: no Reasons if yes:       Last Vitals:     Vitals:    03/12/17 0744 03/12/17 1251 03/12/17 1613 03/12/17 1640   BP: 130/75 135/74  132/71   Pulse: 82 82  82   Resp: 22 20 19   Temp: 97.8 °F (36.6 °C) 98.3 °F (36.8 °C)  98.2 °F (36.8 °C)   SpO2: 98% 94% (!) 6% 91%   Weight:       Height:            IV and DRAINS (will only show if present)   Peripheral IV 03/11/17 Left Antecubital-Site Assessment: Clean, dry, & intact     WOUND (if present)   Wound Type:  none   Dressing present Dressing Present : No   Wound Concerns/Notes:  none     PAIN    Pain Assessment    Pain Intensity 1: 0 (03/12/17 1153)              Patient Stated Pain Goal: 0  o Interventions for Pain:  none  o Intervention effective:   o Time of last intervention:    o Reassessment Completed: yes      Last 3 Weights:  Last 3 Recorded Weights in this Encounter    03/11/17 1805   Weight: (!) 190.1 kg (419 lb)     Weight change:      INTAKE/OUPUT    Current Shift:      Last three shifts:       LAB RESULTS     Recent Labs      03/11/17 1859   WBC  5.7   HGB  13.8   HCT  43.4   PLT  294        Recent Labs      03/11/17   1859   NA  135*   K  3.8   GLU  92   BUN  17   CREA  1.94*   CA  8.8   MG  1.7*       RECOMMENDATIONS AND DISCHARGE PLANNING     1. Pending tests/procedures/ Plan of Care or Other Needs:  consult. 2. Discharge plan for patient and Needs/Barriers: home    3. Estimated Discharge Date: 3/13/17 Posted on Whiteboard in [facility] Room: yes      4. The patient's care plan was reviewed with the oncoming nurse. \"HEALS\" SAFETY CHECK      Fall Risk    Total Score: 1    Safety Measures: Safety Measures: Bed/Chair-Wheels locked, Bed in low position, Call light within reach    A safety check occurred in the patient's room between off going nurse and oncoming nurse listed above. The safety check included the below items  Area Items   H  High Alert Medications - Verify all high alert medication drips (heparin, PCA, etc.)   E  Equipment - Suction is set up for ALL patients (with nori)  - Red plugs utilized for all equipment (IV pumps, etc.)  - WOWs wiped down at end of shift.  - Room stocked with oxygen, suction, and other unit-specific supplies   A  Alarms - Bed alarm is set for fall risk patients  - Ensure chair alarm is in place and activated if patient is up in a chair   L  Lines - Check IV for any infiltration  - Aguayo bag is empty if patient has a Aguayo   - Tubing and IV bags are labeled   S  Safety   - Room is clean, patient is clean, and equipment is clean. - Hallways are clear from equipment besides carts. - Fall bracelet on for fall risk patients  - Ensure room is clear and free of clutter  - Suction is set up for ALL patients (with nori)  - Hallways are clear from equipment besides carts.    - Isolation precautions followed, supplies available outside room, sign posted     Luis Miguel Rebolledo RN

## 2017-03-13 VITALS
TEMPERATURE: 98.6 F | OXYGEN SATURATION: 92 % | SYSTOLIC BLOOD PRESSURE: 132 MMHG | WEIGHT: 293 LBS | HEART RATE: 83 BPM | HEIGHT: 65 IN | RESPIRATION RATE: 20 BRPM | BODY MASS INDEX: 48.82 KG/M2 | DIASTOLIC BLOOD PRESSURE: 85 MMHG

## 2017-03-13 LAB
ANION GAP BLD CALC-SCNC: 6 MMOL/L (ref 3–18)
BASOPHILS # BLD AUTO: 0 K/UL (ref 0–0.06)
BASOPHILS # BLD: 0 % (ref 0–2)
BUN SERPL-MCNC: 19 MG/DL (ref 7–18)
BUN/CREAT SERPL: 15 (ref 12–20)
CALCIUM SERPL-MCNC: 8.7 MG/DL (ref 8.5–10.1)
CHLORIDE SERPL-SCNC: 96 MMOL/L (ref 100–108)
CO2 SERPL-SCNC: 32 MMOL/L (ref 21–32)
CREAT SERPL-MCNC: 1.29 MG/DL (ref 0.6–1.3)
DIFFERENTIAL METHOD BLD: ABNORMAL
EOSINOPHIL # BLD: 0 K/UL (ref 0–0.4)
EOSINOPHIL NFR BLD: 0 % (ref 0–5)
ERYTHROCYTE [DISTWIDTH] IN BLOOD BY AUTOMATED COUNT: 12.9 % (ref 11.6–14.5)
GLUCOSE BLD STRIP.AUTO-MCNC: 122 MG/DL (ref 70–110)
GLUCOSE BLD STRIP.AUTO-MCNC: 130 MG/DL (ref 70–110)
GLUCOSE SERPL-MCNC: 150 MG/DL (ref 74–99)
HCT VFR BLD AUTO: 41.9 % (ref 35–45)
HGB BLD-MCNC: 13 G/DL (ref 12–16)
LYMPHOCYTES # BLD AUTO: 15 % (ref 21–52)
LYMPHOCYTES # BLD: 1.1 K/UL (ref 0.9–3.6)
MCH RBC QN AUTO: 28.8 PG (ref 24–34)
MCHC RBC AUTO-ENTMCNC: 31 G/DL (ref 31–37)
MCV RBC AUTO: 92.9 FL (ref 74–97)
MONOCYTES # BLD: 0.9 K/UL (ref 0.05–1.2)
MONOCYTES NFR BLD AUTO: 12 % (ref 3–10)
NEUTS SEG # BLD: 5.5 K/UL (ref 1.8–8)
NEUTS SEG NFR BLD AUTO: 73 % (ref 40–73)
PLATELET # BLD AUTO: 338 K/UL (ref 135–420)
PMV BLD AUTO: 9.8 FL (ref 9.2–11.8)
POTASSIUM SERPL-SCNC: 4.5 MMOL/L (ref 3.5–5.5)
RBC # BLD AUTO: 4.51 M/UL (ref 4.2–5.3)
SODIUM SERPL-SCNC: 134 MMOL/L (ref 136–145)
WBC # BLD AUTO: 7.5 K/UL (ref 4.6–13.2)

## 2017-03-13 PROCEDURE — 74011000250 HC RX REV CODE- 250: Performed by: HOSPITALIST

## 2017-03-13 PROCEDURE — 80048 BASIC METABOLIC PNL TOTAL CA: CPT | Performed by: FAMILY MEDICINE

## 2017-03-13 PROCEDURE — 82962 GLUCOSE BLOOD TEST: CPT

## 2017-03-13 PROCEDURE — 74011250637 HC RX REV CODE- 250/637: Performed by: HOSPITALIST

## 2017-03-13 PROCEDURE — 36415 COLL VENOUS BLD VENIPUNCTURE: CPT | Performed by: FAMILY MEDICINE

## 2017-03-13 PROCEDURE — 85025 COMPLETE CBC W/AUTO DIFF WBC: CPT | Performed by: FAMILY MEDICINE

## 2017-03-13 PROCEDURE — 94640 AIRWAY INHALATION TREATMENT: CPT

## 2017-03-13 RX ORDER — PREDNISONE 20 MG/1
60 TABLET ORAL
Qty: 12 TAB | Refills: 0 | Status: SHIPPED | OUTPATIENT
Start: 2017-03-13 | End: 2017-03-17

## 2017-03-13 RX ORDER — DOXYCYCLINE 100 MG/1
100 CAPSULE ORAL EVERY 12 HOURS
Qty: 10 CAP | Refills: 0 | Status: SHIPPED | OUTPATIENT
Start: 2017-03-13 | End: 2017-03-18

## 2017-03-13 RX ADMIN — ALBUTEROL SULFATE 2.5 MG: 2.5 SOLUTION RESPIRATORY (INHALATION) at 13:38

## 2017-03-13 RX ADMIN — ALBUTEROL SULFATE 2.5 MG: 2.5 SOLUTION RESPIRATORY (INHALATION) at 08:27

## 2017-03-13 RX ADMIN — Medication 10 ML: at 05:26

## 2017-03-13 RX ADMIN — BUDESONIDE AND FORMOTEROL FUMARATE DIHYDRATE 2 PUFF: 160; 4.5 AEROSOL RESPIRATORY (INHALATION) at 08:27

## 2017-03-13 RX ADMIN — DOXYCYCLINE HYCLATE 100 MG: 100 CAPSULE ORAL at 10:15

## 2017-03-13 NOTE — PROGRESS NOTES
Meds from pill bottles, reviewed with pt:  1. Atorvastatin 40mg PO qDay  2. Metformin 500mg PO BID  3. Coreg 25mg PO BID  4. Aldactone 100mg PO qDay  5. Chlorthalidone 25mg PO qAM  6. Hydroxyzine 10mg PO q8hrs prn.

## 2017-03-13 NOTE — ROUTINE PROCESS
Walking O2 rounds performed. Patient 92% on RA while ambulating, No complaints of SOB, no respiratory distress noted.

## 2017-03-13 NOTE — DISCHARGE INSTRUCTIONS
Asthma Attack: Care Instructions  Your Care Instructions    During an asthma attack, the airways swell and narrow. This makes it hard to breathe. Severe asthma attacks can be life-threatening, but you can help prevent them by keeping your asthma under control and treating symptoms before they get bad. Symptoms include being short of breath, having chest tightness, coughing, and wheezing. Noting and treating these symptoms can also help you avoid future trips to the emergency room. The doctor has checked you carefully, but problems can develop later. If you notice any problems or new symptoms, get medical treatment right away. Follow-up care is a key part of your treatment and safety. Be sure to make and go to all appointments, and call your doctor if you are having problems. It's also a good idea to know your test results and keep a list of the medicines you take. How can you care for yourself at home? · Follow your asthma action plan to prevent and treat attacks. If you don't have an asthma action plan, work with your doctor to create one. · Take your asthma medicines exactly as prescribed. Talk to your doctor right away if you have any questions about how to take them. ¨ Use your quick-relief medicine when you have symptoms of an attack. Quick-relief medicine is usually an albuterol inhaler. Some people need to use quick-relief medicine before they exercise. ¨ Take your controller medicine every day, not just when you have symptoms. Controller medicine is usually an inhaled corticosteroid. The goal is to prevent problems before they occur. Don't use your controller medicine to treat an attack that has already started. It doesn't work fast enough to help. ¨ If your doctor prescribed corticosteroid pills to use during an attack, take them exactly as prescribed. It may take hours for the pills to work, but they may make the episode shorter and help you breathe better.   ¨ Keep your quick-relief medicine with you at all times. · Talk to your doctor before using other medicines. Some medicines, such as aspirin, can cause asthma attacks in some people. · If you have a peak flow meter, use it to check how well you are breathing. This can help you predict when an asthma attack is going to occur. Then you can take medicine to prevent the asthma attack or make it less severe. · Do not smoke or allow others to smoke around you. Avoid smoky places. Smoking makes asthma worse. If you need help quitting, talk to your doctor about stop-smoking programs and medicines. These can increase your chances of quitting for good. · Learn what triggers an asthma attack for you, and avoid the triggers when you can. Common triggers include colds, smoke, air pollution, dust, pollen, mold, pets, cockroaches, stress, and cold air. · Avoid colds and the flu. Get a pneumococcal vaccine shot. If you have had one before, ask your doctor if you need a second dose. Get a flu vaccine every fall. If you must be around people with colds or the flu, wash your hands often. When should you call for help? Call 911 anytime you think you may need emergency care. For example, call if:  · You have severe trouble breathing. Call your doctor now or seek immediate medical care if:  · Your symptoms do not get better after you have followed your asthma action plan. · You have new or worse trouble breathing. · Your coughing and wheezing get worse. · You cough up dark brown or bloody mucus (sputum). · You have a new or higher fever. Watch closely for changes in your health, and be sure to contact your doctor if:  · You need to use quick-relief medicine on more than 2 days a week (unless it is just for exercise). · You cough more deeply or more often, especially if you notice more mucus or a change in the color of your mucus. · You are not getting better as expected. Where can you learn more?   Go to http://dilcia-esha.info/. Enter Q597 in the search box to learn more about \"Asthma Attack: Care Instructions. \"  Current as of: May 23, 2016  Content Version: 11.1  © 3661-1511 [employer]. Care instructions adapted under license by [employer] (which disclaims liability or warranty for this information). If you have questions about a medical condition or this instruction, always ask your healthcare professional. [place] [address] any warranty or liability for your use of this information. Learning About Asthma Triggers  What are triggers? When you have asthma, certain things can make your symptoms worse. These are called triggers. They include:  · Cigarette smoke or air pollution. · Things you are allergic to, such as:  ¨ Pollen, mold, or dust mites. ¨ Pet hair, skin, or saliva. · Illnesses, like colds, flu, or pneumonia. · Exercise. · Dry, cold air. How do triggers affect asthma? Triggers can make it harder for your lungs to work as they should and can lead to sudden difficulty breathing and other symptoms. When you are around a trigger, an asthma attack is more likely. If your symptoms are severe, you may need emergency treatment or have to go to the hospital for treatment. If you know what your triggers are and can avoid them, you may be able to prevent asthma attacks, reduce how often you have them, and make them less severe. What can you do to avoid triggers? The first thing is to know your triggers. When you are having symptoms, note the things around you that might be causing them. Then look for patterns in what may be triggering your symptoms. Record your triggers on a piece of paper or in an asthma diary. When you have your list of possible triggers, work with your doctor to find ways to avoid them. You also can check how well your lungs are working by measuring your peak expiratory flow (PEF) throughout the day.  Your PEF may drop when you are near things that trigger symptoms. Here are some ways to avoid a few common triggers. · Do not smoke or allow others to smoke around you. If you need help quitting, talk to your doctor about stop-smoking programs and medicines. These can increase your chances of quitting for good. · If there is a lot of pollution, pollen, or dust outside, stay at home and keep your windows closed. Use an air conditioner or air filter in your home. Check your local weather report or newspaper for air quality and pollen reports. · Get a flu shot every year. Talk to your doctor about getting a pneumococcal shot. Wash your hands often to prevent infections. · Avoid exercising outdoors in cold weather. If you are outdoors in cold weather, wear a scarf around your face and breathe through your nose. How can you manage an asthma attack? · If you have an asthma action plan, follow the plan. In general:  ¨ Use your quick-relief inhaler as directed by your doctor. If your symptoms do not get better after you use your medicine, have someone take you to the emergency room. Call an ambulance if needed. ¨ If your doctor has given you other inhaled medicines or steroid pills, take them as directed. Where can you learn more? Go to [employer]  Enter M564 in the search box to learn more about \"Learning About Asthma Triggers. \"   © 3709-5422 [employer]. Care instructions adapted under license by [facility] [employer] (which disclaims liability or warranty for this information). This care instruction is for use with your licensed healthcare professional. If you have questions about a medical condition or this instruction, always ask your healthcare professional. [place] [address] any warranty or liability for your use of this information. Content Version: 25.0.603325;  Last Revised: February 23, 2012                Asthma Action Plan: After Your Visit  Your Care Instructions  An asthma action plan is based on peak flow and asthma symptoms. Sorting symptoms and peak flow into red, yellow, and green \"zones\" can help you know how bad your asthma is and what actions you should take. Work with your doctor to make your plan. An action plan may include:  · The peak flow readings and symptoms for each zone. · What medicines to take in each zone. · When to call a doctor. · A list of emergency contact numbers. · A list of your asthma triggers. Follow-up care is a key part of your treatment and safety. Be sure to make and go to all appointments, and call your doctor if you are having problems. It's also a good idea to know your test results and keep a list of the medicines you take. How can you care for yourself at home? · Take your daily medicines to help minimize long-term damage and avoid asthma attacks. · Check your peak flow every morning and evening. This is the best way to know how well your lungs are working. · Check your action plan to see what zone you are in.  ¨ If you are in the green zone, keep taking your daily asthma medicines as prescribed. ¨ If you are in the yellow zone, you may be having or will soon have an asthma attack. You may not have any symptoms, but your lungs are not working as well as they should. Take the medicines listed in your action plan. If you stay in the yellow zone, your doctor may need to increase the dose or add a medicine. ¨ If you are in the red zone, follow your action plan. If your symptoms or peak flow don't improve soon, you may need to go to the emergency room or be admitted to the hospital.  · Use an asthma diary. Write down your peak flow readings in the asthma diary. If you have an attack, write down what caused it (if you know), the symptoms, and what medicine you took.   · Make sure you know how and when to call your doctor or go to the hospital.  · Take both the asthma action plan and the asthma diary--along with your peak flow meter and medicines--when you see your doctor. Tell your doctor if you are having trouble following your action plan. When should you call for help? Call 911 anytime you think you may need emergency care. For example, call if:  · You have severe trouble breathing. Call your doctor now or seek immediate medical care if:  · Your symptoms do not get better after you have followed your asthma action plan. · You cough up yellow, dark brown, or bloody mucus (sputum). Watch closely for changes in your health, and be sure to contact your doctor if:  · Your coughing and wheezing get worse. · You need to use quick-relief medicine on more than 2 days a week (unless it is just for exercise). · You need help figuring out what is triggering your asthma attacks. Where can you learn more? Go to [employer].be  Enter B511 in the search box to learn more about \"Asthma Action Plan: After Your Visit. \"   © 6898-9378 [employer]. Care instructions adapted under license by [employer] (which disclaims liability or warranty for this information). This care instruction is for use with your licensed healthcare professional. If you have questions about a medical condition or this instruction, always ask your healthcare professional. [address] any warranty or liability for your use of this information. Content Version: 13.5.288192; Last Revised: March 9, 2012          DISCHARGE SUMMARY from Nurse    The following personal items are in your possession at time of discharge:    Dental Appliances: None  Visual Aid: None     Home Medications: None  Jewelry: Ring  Clothing:  At bedside, Pants, Dress  Other Valuables: Cell Phone, Purse             PATIENT INSTRUCTIONS:    After general anesthesia or intravenous sedation, for 24 hours or while taking prescription Narcotics:  · Limit your activities  · Do not drive and operate hazardous machinery  · Do not make important personal or business decisions  · Do  not drink alcoholic beverages  · If you have not urinated within 8 hours after discharge, please contact your surgeon on call. Report the following to your surgeon:  · Excessive pain, swelling, redness or odor of or around the surgical area  · Temperature over 100.5  · Nausea and vomiting lasting longer than 4 hours or if unable to take medications  · Any signs of decreased circulation or nerve impairment to extremity: change in color, persistent  numbness, tingling, coldness or increase pain  · Any questions        What to do at Home:  Recommended activity: Activity as tolerated,     If you experience any of the following symptoms SOB or respiratory distress, please follow up with pcp. *  Please give a list of your current medications to your Primary Care Provider. *  Please update this list whenever your medications are discontinued, doses are      changed, or new medications (including over-the-counter products) are added. *  Please carry medication information at all times in case of emergency situations. These are general instructions for a healthy lifestyle:    No smoking/ No tobacco products/ Avoid exposure to second hand smoke    Surgeon General's Warning:  Quitting smoking now greatly reduces serious risk to your health. Obesity, smoking, and sedentary lifestyle greatly increases your risk for illness    A healthy diet, regular physical exercise & weight monitoring are important for maintaining a healthy lifestyle    You may be retaining fluid if you have a history of heart failure or if you experience any of the following symptoms:  Weight gain of 3 pounds or more overnight or 5 pounds in a week, increased swelling in our hands or feet or shortness of breath while lying flat in bed. Please call your doctor as soon as you notice any of these symptoms; do not wait until your next office visit.     Recognize signs and symptoms of STROKE:    F-face looks uneven    A-arms unable to move or move unevenly    S-speech slurred or non-existent    T-time-call 911 as soon as signs and symptoms begin-DO NOT go       Back to bed or wait to see if you get better-TIME IS BRAIN. Warning Signs of HEART ATTACK     Call 911 if you have these symptoms:   Chest discomfort. Most heart attacks involve discomfort in the center of the chest that lasts more than a few minutes, or that goes away and comes back. It can feel like uncomfortable pressure, squeezing, fullness, or pain.  Discomfort in other areas of the upper body. Symptoms can include pain or discomfort in one or both arms, the back, neck, jaw, or stomach.  Shortness of breath with or without chest discomfort.  Other signs may include breaking out in a cold sweat, nausea, or lightheadedness. Don't wait more than five minutes to call 911 - MINUTES MATTER! Fast action can save your life. Calling 911 is almost always the fastest way to get lifesaving treatment. Emergency Medical Services staff can begin treatment when they arrive -- up to an hour sooner than if someone gets to the hospital by car. The discharge information has been reviewed with the patient. The patient verbalized understanding. Discharge medications reviewed with the patient and appropriate educational materials and side effects teaching were provided. DISCHARGE SUMMARY from Nurse    The following personal items are in your possession at time of discharge:    Dental Appliances: None  Visual Aid: None     Home Medications: None  Jewelry: Ring  Clothing:  At bedside, Pants, Dress  Other Valuables: Cell Phone, Purse             PATIENT INSTRUCTIONS:    After general anesthesia or intravenous sedation, for 24 hours or while taking prescription Narcotics:  · Limit your activities  · Do not drive and operate hazardous machinery  · Do not make important personal or business decisions  · Do  not drink alcoholic beverages  · If you have not urinated within 8 hours after discharge, please contact your surgeon on call. Report the following to your surgeon:  · Excessive pain, swelling, redness or odor of or around the surgical area  · Temperature over 100.5  · Nausea and vomiting lasting longer than 4 hours or if unable to take medications  · Any signs of decreased circulation or nerve impairment to extremity: change in color, persistent  numbness, tingling, coldness or increase pain  · Any questions        What to do at Home:  Recommended activity: Activity as tolerated,     If you experience any of the following symptoms  Sob or respiratory distress, please follow up with .pcp      *  Please give a list of your current medications to your Primary Care Provider. *  Please update this list whenever your medications are discontinued, doses are      changed, or new medications (including over-the-counter products) are added. *  Please carry medication information at all times in case of emergency situations. These are general instructions for a healthy lifestyle:    No smoking/ No tobacco products/ Avoid exposure to second hand smoke    Surgeon General's Warning:  Quitting smoking now greatly reduces serious risk to your health. Obesity, smoking, and sedentary lifestyle greatly increases your risk for illness    A healthy diet, regular physical exercise & weight monitoring are important for maintaining a healthy lifestyle    You may be retaining fluid if you have a history of heart failure or if you experience any of the following symptoms:  Weight gain of 3 pounds or more overnight or 5 pounds in a week, increased swelling in our hands or feet or shortness of breath while lying flat in bed. Please call your doctor as soon as you notice any of these symptoms; do not wait until your next office visit.     Recognize signs and symptoms of STROKE:    F-face looks uneven    A-arms unable to move or move unevenly    S-speech slurred or non-existent    T-time-call 911 as soon as signs and symptoms begin-DO NOT go       Back to bed or wait to see if you get better-TIME IS BRAIN. Warning Signs of HEART ATTACK     Call 911 if you have these symptoms:   Chest discomfort. Most heart attacks involve discomfort in the center of the chest that lasts more than a few minutes, or that goes away and comes back. It can feel like uncomfortable pressure, squeezing, fullness, or pain.  Discomfort in other areas of the upper body. Symptoms can include pain or discomfort in one or both arms, the back, neck, jaw, or stomach.  Shortness of breath with or without chest discomfort.  Other signs may include breaking out in a cold sweat, nausea, or lightheadedness. Don't wait more than five minutes to call 911 - MINUTES MATTER! Fast action can save your life. Calling 911 is almost always the fastest way to get lifesaving treatment. Emergency Medical Services staff can begin treatment when they arrive -- up to an hour sooner than if someone gets to the hospital by car. The discharge information has been reviewed with the patient. The patient verbalized understanding. Discharge medications reviewed with the patient and appropriate educational materials and side effects teaching were provided. Patient {DDIXITON:47025    Satomi Activation    Thank you for requesting access to Satomi. Please follow the instructions below to securely access and download your online medical record. Satomi allows you to send messages to your doctor, view your test results, renew your prescriptions, schedule appointments, and more. How Do I Sign Up? 1. In your internet browser, go to [email or web address]  2. Click on the First Time User? Click Here link in the Sign In box. You will be redirect to the New Member Sign Up page. 3. Enter your Satomi Access Code exactly as it appears below.  You will not need to use this code after youve completed the sign-up process. If you do not sign up before the expiration date, you must request a new code. Pansieve Access Code: [email or web address]  Expires: 2017  1:21 PM (This is the date your Pansieve access code will )    4. Enter the last four digits of your Social Security Number (xxxx) and Date of Birth (mm/dd/yyyy) as indicated and click Submit. You will be taken to the next sign-up page. 5. Create a "[employer]t ID. This will be your Pansieve login ID and cannot be changed, so think of one that is secure and easy to remember. 6. Create a Pansieve password. You can change your password at any time. 7. Enter your Password Reset Question and Answer. This can be used at a later time if you forget your password. 8. Enter your e-mail address. You will receive e-mail notification when new information is available in [address]. 9. Click Sign Up. You can now view and download portions of your medical record. 10. Click the Download Summary menu link to download a portable copy of your medical information. Additional Information    If you have questions, please visit the Frequently Asked Questions section of the Pansieve website at [email or web address] Remember, Pansieve is NOT to be used for urgent needs. For medical emergencies, dial 911.

## 2017-03-13 NOTE — INTERDISCIPLINARY ROUNDS
IDR/SLIDR Summary          Patient: Lilia Cuevas MRN: [id number]    Age: 44 y.o. YOB: 1980 Room/Bed: [facility]/   Admit Diagnosis: Hypoxia  Asthmatic bronchitis with acute exacerbation  Principal Diagnosis: <principal problem not specified>   Goals: increase Respiratory status  Readmission: NO  Quality Measure: Not applicable  VTE Prophylaxis: Not needed  Influenza Vaccine screening completed? YES  Pneumococcal Vaccine screening completed? YES  Mobility needs: No   Nutrition plan:No  Consults:Respiratory and Case Management    Financial concerns:No  Escalated to CM? NO  RRAT Score: medium   Interventions:H2H  Testing due for pt today?  NO  LOS: 0 days Expected length of stay 2 days  Discharge plan: plans to return home   PCP: Corinna Titus MD  Transportation needs: No    Days before discharge:one day until discharge  and discharge pending  Discharge disposition: Home    Signed:     Gaviota Mireles RN  3/12/2017  10:17 PM

## 2017-03-13 NOTE — DIABETES MGMT
Glycemic Control Plan of Care    Assessment/Recommendations:  Patient is 44year old with past medical history including diabetes mellitus, COPD, asthma, hypertension, anxiety, and obesity - was admitted on 3/11/2017 with c/o cough, dizziness, and lightheadedness after taking robitussin with codeine, blood pressure med and went to sleep. Noted:  Asthma exacerbation. Diabetes mellitus with current A1C of 5.8% (03/12/2017). POC BG range on 03/12/2017: 130-165 mg/dL. POC BG report on 03/13/2017 at time of review: 122 mg/dL. Patient received a total of 40 mg IV Solumedrol and 60 mg prednisone on 03/12/2017. Patient for discharge to home today, 03/13/2017. Most recent blood glucose values:    Results for Molina Pew (MRN [id number]) as of 3/13/2017 11:35   Ref. Range 3/12/2017 07:48 3/12/2017 11:51 3/12/2017 16:39 3/12/2017 22:02   GLUCOSE,FAST - POC Latest Ref Range: 70 - 110 mg/dL 158 (H) 165 (H) 130 (H) 142 (H)     Results for Molina Pew (MRN [id number]) as of 3/13/2017 11:35   Ref. Range 3/13/2017 07:51   GLUCOSE,FAST - POC Latest Ref Range: 70 - 110 mg/dL 122 (H)     Current A1C: 5.8% (03/12/2017) is equivalent to average blood glucose of 120 mg/dL during the past 2-3 months. Current hospital diabetes medications:  Correctional lispro insulin ACHS. Normal sensitivity dose.     Total daily dose insulin requirement previous day: 03/12/2017  Lispro: 4 units    Home diabetes medications:  Metformin 500 mg twice daily    Diet: Diabetic consistent carb; regular    Education:  ___  Refer to Diabetes Education Record             _X__  Education not indicated at this time    Dallas Thompson RN

## 2017-03-14 NOTE — DISCHARGE SUMMARY
[address]  [address] [place] Bales SUMMARY    Name:  Jin Alanis  MR#:  [id number]  :  1980  Account #:  [de-identified]  Date of Adm:  2017  Date of Discharge:  2017      DISCHARGE DIAGNOSES:  1. Acute asthma exacerbation of mild, intermittent asthma. 2. Acute bronchitis. Hypoxia due to the above. 3. Obstructive sleep apnea/obesity hypoventilation syndrome. 4. Diabetes mellitus. SERVICE: The patient was admitted to the hospitalist service. CONSULTS: None. PROCEDURES: None. HISTORY OF PRESENT ILLNESS: Please refer to the admission H  and P.    Briefly, the patient is a 54-year-old female with a history significant for  the above, who came to the emergency department complaining of  shortness of breath, dizziness and lightheadedness. [facility] and her daughter were  diagnosed with bronchitis, discharged with Robitussin with codeine. She was having a worsening cough and while sitting on the commode  felt hot and dry, drank water and appeared to have nodded out. She  woke up around 20 minutes afterwards, was back to baseline. It is not  clear if she lost consciousness. She came to the emergency department and was reevaluated. She  was noted to have O2 saturation down to the 80s. She had a V/Q scan  with low probability for PE. She was given steroids, magnesium and  referred to our service for further evaluation. ALLERGIES: SHE HAS NOTABLE DRUG ALLERGY TO ASPIRIN. On examination, vital signs are stable and normal, O2 saturation 98%  on 2 liters. She had a regular heart, clear lungs, a benign abdomen. There was  perhaps some tenderness on deep palpation that was general. No  rebound tenderness. LABORATORY DATA: CBC normal, metabolic panel normal except for  a bun and creatinine 17/1. 94. HOSPITAL COURSE BY PROBLEM:  1.  Acute hypoxic respiratory failure due to acute asthma exacerbation  and acute bronchitis: Noted to have an ABG on admission of pH of  7.342, pCO2 of 52, pO2 of 72, and bicarbonate 28.2. She was  saturating in the 80s with any sort of ambulation. We maintained her  on steroid therapy and transitioned over to p.o. She quickly improved. She had no wheezing on repeat examination. She was ambulated, and  she was saturating in the 90s. She had no complaints today, and on  review of the history, she states that she rarely ever has to use her  albuterol and only uses when she gets sick with a cold about once  every 4-6 months. We will be discharging the patient with a short  course of steroids, as well as antibiotics as listed below. 2. Acute bronchitis: As per above. 3. Hypoxia: As per above, resolved. 4. Obstructive sleep apnea/obesity hypoventilation syndrome:  Reportedly had a CPAP a couple of years ago, which broke. She has  been trying to get another machine without success. We tried to  arrange one for her. She should follow up with her primary care  physician otherwise, to assist with this. 4. Diabetes mellitus: Maintain on sliding scale insulin regimen,  hemoglobin A1c of 5.8%. Resume metformin on discharge. 5. In regards to the patient's apparently nodding off, when I spoke with  her, she states that after she went home from [facility], she was extremely tired and has had very little sleep. She  took her Robitussin with codeine and blood pressure medications  and slept. She denies actually losing consciousness. She has had no  recurrent symptoms. On examination today, vital signs are stable and normal. The patient  denies any fevers or chills, nausea, vomiting, chest pain, shortness of  breath, palpitations, or edema. She would like to go home. She  ambulates without any difficulty. She has a regular heart, clear lungs, benign abdomen. No calf  tenderness or edema. LABORATORY DATA: Metabolic panel essentially normal with a  sodium 134.  BUN and creatinine of .29. CBC within normal limits. DISPOSITION: The patient will be discharged home. MEDICATION ON DISCHARGE: NEW MEDICINES AS FOLLOWS:  1. Doxycycline 100 mg p.o. every 12 hours for 5 additional days. 2. Prednisone 20 mg. 2 tabs p.o. daily for 4 additional days. OTHERWISE, RESUME THE FOLLOWIN. Chlorthalidone 25 mg p.o. daily. 2. Advair 250/50 one puff b.i.d.  3. Robitussin-AC 5 mL t.i.d. as needed cough. 4. Atarax 10 mg p.o. every 8 hours p.r.n. itching. 5. Lipitor 40 mg p.o. at bedtime. 6. Metformin 500 mg p.o. b.i.d.  7. Spironolactone 100 mg p.o. daily. 8. Vitamin D2 50,000 units p.o. every week. She had Norvasc,Benazepril, Coreg, Lasix and Labetalol all listed on  her prior to admission medications. She only takes Coreg on an  outpatient basis. As her pressure has been stable, we hare holding off  on her Coreg at the current time. This can be reviewed on followup. FOLLOWUP: With her PCP in 7-10 days. TIME SPENT: Total time of discharge greater than 30 minutes.         Pavithra Hughes MD PP / NAEL  D:  2017   10:57  T:  2017   06:58  Job #:  [id number]

## 2017-09-14 ENCOUNTER — APPOINTMENT (OUTPATIENT)
Dept: GENERAL RADIOLOGY | Age: 37
End: 2017-09-14
Attending: EMERGENCY MEDICINE
Payer: MEDICAID

## 2017-09-14 ENCOUNTER — HOSPITAL ENCOUNTER (EMERGENCY)
Age: 37
Discharge: HOME | End: 2017-09-14
Attending: EMERGENCY MEDICINE
Payer: MEDICAID

## 2017-09-14 VITALS
TEMPERATURE: 98.3 F | OXYGEN SATURATION: 93 % | HEART RATE: 72 BPM | WEIGHT: 293 LBS | RESPIRATION RATE: 17 BRPM | BODY MASS INDEX: 48.82 KG/M2 | HEIGHT: 65 IN | DIASTOLIC BLOOD PRESSURE: 89 MMHG | SYSTOLIC BLOOD PRESSURE: 150 MMHG

## 2017-09-14 DIAGNOSIS — R07.9 CHEST PAIN, UNSPECIFIED TYPE: Primary | ICD-10-CM

## 2017-09-14 LAB
ALBUMIN SERPL-MCNC: 3.6 G/DL (ref 3.4–5)
ALBUMIN/GLOB SERPL: 0.8 {RATIO} (ref 0.8–1.7)
ALP SERPL-CCNC: 66 U/L (ref 45–117)
ALT SERPL-CCNC: 28 U/L (ref 13–56)
ANION GAP SERPL CALC-SCNC: 4 MMOL/L (ref 3–18)
AST SERPL-CCNC: 15 U/L (ref 15–37)
BASOPHILS # BLD: 0 K/UL (ref 0–0.06)
BASOPHILS NFR BLD: 0 % (ref 0–2)
BILIRUB SERPL-MCNC: 0.8 MG/DL (ref 0.2–1)
BUN SERPL-MCNC: 12 MG/DL (ref 7–18)
BUN/CREAT SERPL: 10 (ref 12–20)
CALCIUM SERPL-MCNC: 9.6 MG/DL (ref 8.5–10.1)
CHLORIDE SERPL-SCNC: 95 MMOL/L (ref 100–108)
CK MB CFR SERPL CALC: NORMAL % (ref 0–4)
CK MB SERPL-MCNC: <1 NG/ML (ref 5–25)
CK SERPL-CCNC: 95 U/L (ref 26–192)
CO2 SERPL-SCNC: 36 MMOL/L (ref 21–32)
CREAT SERPL-MCNC: 1.16 MG/DL (ref 0.6–1.3)
DIFFERENTIAL METHOD BLD: ABNORMAL
EOSINOPHIL # BLD: 0.1 K/UL (ref 0–0.4)
EOSINOPHIL NFR BLD: 1 % (ref 0–5)
ERYTHROCYTE [DISTWIDTH] IN BLOOD BY AUTOMATED COUNT: 13.1 % (ref 11.6–14.5)
GLOBULIN SER CALC-MCNC: 4.4 G/DL (ref 2–4)
GLUCOSE SERPL-MCNC: 80 MG/DL (ref 74–99)
HCT VFR BLD AUTO: 47.1 % (ref 35–45)
HGB BLD-MCNC: 15.1 G/DL (ref 12–16)
LYMPHOCYTES # BLD: 2 K/UL (ref 0.9–3.6)
LYMPHOCYTES NFR BLD: 34 % (ref 21–52)
MCH RBC QN AUTO: 29.6 PG (ref 24–34)
MCHC RBC AUTO-ENTMCNC: 32.1 G/DL (ref 31–37)
MCV RBC AUTO: 92.4 FL (ref 74–97)
MONOCYTES # BLD: 0.6 K/UL (ref 0.05–1.2)
MONOCYTES NFR BLD: 10 % (ref 3–10)
NEUTS SEG # BLD: 3.3 K/UL (ref 1.8–8)
NEUTS SEG NFR BLD: 55 % (ref 40–73)
PLATELET # BLD AUTO: 309 K/UL (ref 135–420)
PMV BLD AUTO: 9.9 FL (ref 9.2–11.8)
POTASSIUM SERPL-SCNC: 3.9 MMOL/L (ref 3.5–5.5)
PROT SERPL-MCNC: 8 G/DL (ref 6.4–8.2)
RBC # BLD AUTO: 5.1 M/UL (ref 4.2–5.3)
SODIUM SERPL-SCNC: 135 MMOL/L (ref 136–145)
TROPONIN I SERPL-MCNC: <0.02 NG/ML (ref 0–0.04)
WBC # BLD AUTO: 6 K/UL (ref 4.6–13.2)

## 2017-09-14 PROCEDURE — 93005 ELECTROCARDIOGRAM TRACING: CPT

## 2017-09-14 PROCEDURE — 71010 XR CHEST PORT: CPT

## 2017-09-14 PROCEDURE — 80053 COMPREHEN METABOLIC PANEL: CPT | Performed by: EMERGENCY MEDICINE

## 2017-09-14 PROCEDURE — 96374 THER/PROPH/DIAG INJ IV PUSH: CPT

## 2017-09-14 PROCEDURE — 96361 HYDRATE IV INFUSION ADD-ON: CPT

## 2017-09-14 PROCEDURE — 99284 EMERGENCY DEPT VISIT MOD MDM: CPT

## 2017-09-14 PROCEDURE — 85025 COMPLETE CBC W/AUTO DIFF WBC: CPT | Performed by: EMERGENCY MEDICINE

## 2017-09-14 PROCEDURE — 82550 ASSAY OF CK (CPK): CPT | Performed by: EMERGENCY MEDICINE

## 2017-09-14 PROCEDURE — 74011250636 HC RX REV CODE- 250/636: Performed by: EMERGENCY MEDICINE

## 2017-09-14 RX ORDER — ONDANSETRON 2 MG/ML
4 INJECTION INTRAMUSCULAR; INTRAVENOUS
Status: DISCONTINUED | OUTPATIENT
Start: 2017-09-14 | End: 2017-09-15 | Stop reason: HOSPADM

## 2017-09-14 RX ADMIN — SODIUM CHLORIDE 1000 ML: 900 INJECTION, SOLUTION INTRAVENOUS at 19:09

## 2017-09-14 NOTE — ED PROVIDER NOTES
HPI Comments: 3:78 PM Author Trinity is a 44 y.o. female with a h/o HTN, anxiety, asthma who presents to the ED c/o intermittant headache x 4-5 days with blurry vision. She notes SOB, chest tightness, tingling and warmth that began yesterday. She notes that these associated symptoms began while she was sitting. Patient also notes fatigue and chronic difficulty sleeping, stating that she often goes to sleep at 6 AM. She has taken Tylenol, which has lessened headache severity. Patient has normal appetite and denies urinary Sx. She reports sporadic compliance with her medications and most recently used her inhaler last night. She also notes that she has been drinking a lot of soda recently. She denies tobacco or drug use, but occasionally drinks alcohol. PCP:  Corinna Titus MD      The history is provided by the patient. No  was used. Past Medical History:   Diagnosis Date    Anxiety     Asthma     HTN (hypertension)        History reviewed. No pertinent surgical history. Family History:   Problem Relation Age of Onset    Stroke Mother     Hypertension Mother     Hypertension Father        Social History     Social History    Marital status: SINGLE     Spouse name: N/A    Number of children: N/A    Years of education: N/A     Occupational History    Not on file. Social History Main Topics    Smoking status: Never Smoker    Smokeless tobacco: Not on file    Alcohol use Yes      Comment: socially    Drug use: Not on file    Sexual activity: Not on file     Other Topics Concern    Not on file     Social History Narrative         ALLERGIES: Aspirin    Review of Systems   Constitutional: Positive for fatigue. Negative for appetite change. Eyes: Positive for visual disturbance. Respiratory: Positive for chest tightness and shortness of breath. Genitourinary: Negative for difficulty urinating, dysuria and hematuria. Neurological: Positive for headaches.    All other systems reviewed and are negative. Vitals:    09/14/17 1737 09/14/17 1809 09/14/17 1810 09/14/17 1823   BP: (!) 165/108 (!) 158/95     Pulse: 78  73    Resp: 20  12    Temp: 98.3 °F (36.8 °C)      SpO2: 95%  98%    Weight:    (!) 190.1 kg (419 lb)   Height:    5' 5\" (1.651 m)            Physical Exam   Constitutional: She is oriented to person, place, and time. She appears well-developed and well-nourished. Non-toxic appearance. She does not have a sickly appearance. She does not appear ill. No distress. Morbidly obese    HENT:   Head: Normocephalic and atraumatic. Mouth/Throat: Oropharynx is clear and moist. No oropharyngeal exudate. Eyes: Conjunctivae and EOM are normal. Pupils are equal, round, and reactive to light. No scleral icterus. Neck: Trachea normal and normal range of motion. Neck supple. No hepatojugular reflux and no JVD present. No tracheal deviation present. No thyromegaly present. Cardiovascular: Normal rate, regular rhythm, S1 normal, S2 normal, normal heart sounds, intact distal pulses and normal pulses. Exam reveals no gallop, no S3 and no S4. No murmur heard. Pulses:       Radial pulses are 2+ on the right side, and 2+ on the left side. Dorsalis pedis pulses are 2+ on the right side, and 2+ on the left side. Pulmonary/Chest: Effort normal and breath sounds normal. No respiratory distress. She has no decreased breath sounds. She has no wheezes. She has no rhonchi. She has no rales. Abdominal: Soft. Normal appearance and bowel sounds are normal. She exhibits no distension and no mass. There is no hepatosplenomegaly. There is no tenderness. There is no rigidity, no rebound, no guarding, no CVA tenderness, no tenderness at McBurney's point and negative Winters's sign. Musculoskeletal: Normal range of motion. She exhibits no edema or tenderness.    Strength 5/5 throughout    Lymphadenopathy:        Head (right side): No submental, no submandibular, no preauricular and no occipital adenopathy present. Head (left side): No submental, no submandibular, no preauricular and no occipital adenopathy present. She has no cervical adenopathy. Right: No supraclavicular adenopathy present. Left: No supraclavicular adenopathy present. Neurological: She is alert and oriented to person, place, and time. She has normal strength and normal reflexes. She is not disoriented. No cranial nerve deficit or sensory deficit. Coordination and gait normal. GCS eye subscore is 4. GCS verbal subscore is 5. GCS motor subscore is 6. Grossly intact    Skin: Skin is warm, dry and intact. No rash noted. She is not diaphoretic. Psychiatric: She has a normal mood and affect. Her speech is normal and behavior is normal. Judgment and thought content normal. Cognition and memory are normal.   Nursing note and vitals reviewed. MDM  Number of Diagnoses or Management Options  Chest pain, unspecified type:   Diagnosis management comments: DIFFERENTIAL DIAGNOSES/ MEDICAL DECISION MAKING:  Chest pain etiologies include acute cardiac events to include possible acute myocardial infarction, acute coronary syndrome, pneumonia, chest wall pain (myofascial/ musculoskeletal etiology), chronic obstructive pulmonary disease (copd), acute asthma exacerbation, congestive heart failure, acute bronchitis, pulmonary embolism, upper respiratory infection, referred abdominal pain, other etiologies, versus combination of the above. ED Course       Procedures     Labs essentially normal. Cardiac enzymes negative. Chest X-Ray showed stable cardiomegaly, no acute process. EKG showed NSR with rate of 79 bpm. With no ST elevations or depression and non specific T wave changes. 8:00 PM 9/14/2017      I have reassessed the patient. Patient is feeling better. Patient was discharged in stable condition. Patient is to return to emergency department if any new or worsening condition. Scribe Attestation      An Lopes acting as a scribe for and in the presence of Zacarias Conti DO      September 14, 2017 at 6:32 PM       Provider Attestation:      I personally performed the services described in the documentation, reviewed the documentation, as recorded by the scribe in my presence, and it accurately and completely records my words and actions.  September 14, 2017 at 6:32 PM - Elaine Cantu DO

## 2017-09-14 NOTE — ED TRIAGE NOTES
Pt has c/o chest pain, headache and SOB X 3 days. Pt reports not taken B/P medications as prescribed.  Pt able to speak in full sentences with no difficulty

## 2017-09-15 LAB
ATRIAL RATE: 79 BPM
CALCULATED P AXIS, ECG09: 42 DEGREES
CALCULATED R AXIS, ECG10: -30 DEGREES
CALCULATED T AXIS, ECG11: 33 DEGREES
DIAGNOSIS, 93000: NORMAL
P-R INTERVAL, ECG05: 158 MS
Q-T INTERVAL, ECG07: 418 MS
QRS DURATION, ECG06: 82 MS
QTC CALCULATION (BEZET), ECG08: 479 MS
VENTRICULAR RATE, ECG03: 79 BPM

## 2017-09-15 NOTE — DISCHARGE INSTRUCTIONS
Chest Pain: Care Instructions  Your Care Instructions  There are many things that can cause chest pain. Some are not serious and will get better on their own in a few days. But some kinds of chest pain need more testing and treatment. Your doctor may have recommended a follow-up visit in the next 8 to 12 hours. If you are not getting better, you may need more tests or treatment. Even though your doctor has released you, you still need to watch for any problems. The doctor carefully checked you, but sometimes problems can develop later. If you have new symptoms or if your symptoms do not get better, get medical care right away. If you have worse or different chest pain or pressure that lasts more than 5 minutes or you passed out (lost consciousness), call 911 or seek other emergency help right away. A medical visit is only one step in your treatment. Even if you feel better, you still need to do what your doctor recommends, such as going to all suggested follow-up appointments and taking medicines exactly as directed. This will help you recover and help prevent future problems. How can you care for yourself at home? · Rest until you feel better. · Take your medicine exactly as prescribed. Call your doctor if you think you are having a problem with your medicine. · Do not drive after taking a prescription pain medicine. When should you call for help? Call 911 if:  · You passed out (lost consciousness). · You have severe difficulty breathing. · You have symptoms of a heart attack. These may include:  ¨ Chest pain or pressure, or a strange feeling in your chest.  ¨ Sweating. ¨ Shortness of breath. ¨ Nausea or vomiting. ¨ Pain, pressure, or a strange feeling in your back, neck, jaw, or upper belly or in one or both shoulders or arms. ¨ Lightheadedness or sudden weakness. ¨ A fast or irregular heartbeat.   After you call 911, the  may tell you to chew 1 adult-strength or 2 to 4 low-dose aspirin. Wait for an ambulance. Do not try to drive yourself. Call your doctor today if:  · You have any trouble breathing. · Your chest pain gets worse. · You are dizzy or lightheaded, or you feel like you may faint. · You are not getting better as expected. · You are having new or different chest pain. Where can you learn more? Go to http://dilcia-esha.info/. Enter A120 in the search box to learn more about \"Chest Pain: Care Instructions. \"  Current as of: March 20, 2017  Content Version: 11.3  © 0617-4343 [employer]. Care instructions adapted under license by [employer] (which disclaims liability or warranty for this information). If you have questions about a medical condition or this instruction, always ask your healthcare professional. [address] any warranty or liability for your use of this information.

## 2018-04-24 PROCEDURE — 93005 ELECTROCARDIOGRAM TRACING: CPT

## 2018-04-24 PROCEDURE — 99284 EMERGENCY DEPT VISIT MOD MDM: CPT

## 2018-04-25 ENCOUNTER — HOSPITAL ENCOUNTER (EMERGENCY)
Age: 38
Discharge: HOME | End: 2018-04-25
Attending: EMERGENCY MEDICINE
Payer: MEDICAID

## 2018-04-25 ENCOUNTER — APPOINTMENT (OUTPATIENT)
Dept: GENERAL RADIOLOGY | Age: 38
End: 2018-04-25
Attending: EMERGENCY MEDICINE
Payer: MEDICAID

## 2018-04-25 VITALS
RESPIRATION RATE: 33 BRPM | SYSTOLIC BLOOD PRESSURE: 136 MMHG | OXYGEN SATURATION: 93 % | HEART RATE: 78 BPM | BODY MASS INDEX: 71.06 KG/M2 | WEIGHT: 293 LBS | TEMPERATURE: 98.7 F | DIASTOLIC BLOOD PRESSURE: 72 MMHG

## 2018-04-25 DIAGNOSIS — R05.9 COUGH: ICD-10-CM

## 2018-04-25 DIAGNOSIS — R07.89 CHEST WALL PAIN: Primary | ICD-10-CM

## 2018-04-25 LAB
ALBUMIN SERPL-MCNC: 4 G/DL (ref 3.4–5)
ALBUMIN/GLOB SERPL: 0.8 {RATIO} (ref 0.8–1.7)
ALP SERPL-CCNC: 96 U/L (ref 45–117)
ALT SERPL-CCNC: 46 U/L (ref 13–56)
ANION GAP SERPL CALC-SCNC: 6 MMOL/L (ref 3–18)
AST SERPL-CCNC: 36 U/L (ref 15–37)
ATRIAL RATE: 84 BPM
BASOPHILS # BLD: 0 K/UL (ref 0–0.1)
BASOPHILS NFR BLD: 0 % (ref 0–2)
BILIRUB SERPL-MCNC: 0.3 MG/DL (ref 0.2–1)
BUN SERPL-MCNC: 14 MG/DL (ref 7–18)
BUN/CREAT SERPL: 11 (ref 12–20)
CALCIUM SERPL-MCNC: 9.1 MG/DL (ref 8.5–10.1)
CALCULATED P AXIS, ECG09: 38 DEGREES
CALCULATED R AXIS, ECG10: -8 DEGREES
CALCULATED T AXIS, ECG11: 54 DEGREES
CHLORIDE SERPL-SCNC: 98 MMOL/L (ref 100–108)
CK MB CFR SERPL CALC: 0.5 % (ref 0–4)
CK MB CFR SERPL CALC: ABNORMAL % (ref 0–4)
CK MB SERPL-MCNC: 1.2 NG/ML (ref 5–25)
CK MB SERPL-MCNC: <1 NG/ML (ref 5–25)
CK SERPL-CCNC: 195 U/L (ref 26–192)
CK SERPL-CCNC: 262 U/L (ref 26–192)
CO2 SERPL-SCNC: 35 MMOL/L (ref 21–32)
CREAT SERPL-MCNC: 1.3 MG/DL (ref 0.6–1.3)
DIAGNOSIS, 93000: NORMAL
DIFFERENTIAL METHOD BLD: ABNORMAL
EOSINOPHIL # BLD: 0.1 K/UL (ref 0–0.4)
EOSINOPHIL NFR BLD: 2 % (ref 0–5)
ERYTHROCYTE [DISTWIDTH] IN BLOOD BY AUTOMATED COUNT: 13.2 % (ref 11.6–14.5)
GLOBULIN SER CALC-MCNC: 5.2 G/DL (ref 2–4)
GLUCOSE SERPL-MCNC: 94 MG/DL (ref 74–99)
HCG SERPL QL: NEGATIVE
HCT VFR BLD AUTO: 48.4 % (ref 35–45)
HGB BLD-MCNC: 15.1 G/DL (ref 12–16)
LYMPHOCYTES # BLD: 1.6 K/UL (ref 0.9–3.6)
LYMPHOCYTES NFR BLD: 22 % (ref 21–52)
MCH RBC QN AUTO: 28.7 PG (ref 24–34)
MCHC RBC AUTO-ENTMCNC: 31.2 G/DL (ref 31–37)
MCV RBC AUTO: 92 FL (ref 74–97)
MONOCYTES # BLD: 0.5 K/UL (ref 0.05–1.2)
MONOCYTES NFR BLD: 7 % (ref 3–10)
NEUTS SEG # BLD: 4.9 K/UL (ref 1.8–8)
NEUTS SEG NFR BLD: 69 % (ref 40–73)
P-R INTERVAL, ECG05: 156 MS
PLATELET # BLD AUTO: 402 K/UL (ref 135–420)
PMV BLD AUTO: 9.9 FL (ref 9.2–11.8)
POTASSIUM SERPL-SCNC: 3.9 MMOL/L (ref 3.5–5.5)
PROT SERPL-MCNC: 9.2 G/DL (ref 6.4–8.2)
Q-T INTERVAL, ECG07: 414 MS
QRS DURATION, ECG06: 84 MS
QTC CALCULATION (BEZET), ECG08: 489 MS
RBC # BLD AUTO: 5.26 M/UL (ref 4.2–5.3)
SODIUM SERPL-SCNC: 139 MMOL/L (ref 136–145)
TROPONIN I SERPL-MCNC: <0.02 NG/ML (ref 0–0.04)
TROPONIN I SERPL-MCNC: <0.02 NG/ML (ref 0–0.04)
VENTRICULAR RATE, ECG03: 84 BPM
WBC # BLD AUTO: 7.1 K/UL (ref 4.6–13.2)

## 2018-04-25 PROCEDURE — 71045 X-RAY EXAM CHEST 1 VIEW: CPT

## 2018-04-25 PROCEDURE — 84703 CHORIONIC GONADOTROPIN ASSAY: CPT | Performed by: EMERGENCY MEDICINE

## 2018-04-25 PROCEDURE — 85025 COMPLETE CBC W/AUTO DIFF WBC: CPT | Performed by: EMERGENCY MEDICINE

## 2018-04-25 PROCEDURE — 82550 ASSAY OF CK (CPK): CPT | Performed by: EMERGENCY MEDICINE

## 2018-04-25 PROCEDURE — 74011250637 HC RX REV CODE- 250/637: Performed by: EMERGENCY MEDICINE

## 2018-04-25 PROCEDURE — 80053 COMPREHEN METABOLIC PANEL: CPT | Performed by: EMERGENCY MEDICINE

## 2018-04-25 RX ORDER — GUAIFENESIN/DEXTROMETHORPHAN 100-10MG/5
10 SYRUP ORAL
Status: COMPLETED | OUTPATIENT
Start: 2018-04-25 | End: 2018-04-25

## 2018-04-25 RX ORDER — ALBUTEROL SULFATE 90 UG/1
1 AEROSOL, METERED RESPIRATORY (INHALATION)
Qty: 1 INHALER | Refills: 0 | Status: SHIPPED | OUTPATIENT
Start: 2018-04-25 | End: 2018-09-03

## 2018-04-25 RX ADMIN — GUAIFENESIN AND DEXTROMETHORPHAN 10 ML: 100; 10 SYRUP ORAL at 03:48

## 2018-04-25 NOTE — DISCHARGE INSTRUCTIONS
Cough: Care Instructions  Your Care Instructions    A cough is your body's response to something that bothers your throat or airways. Many things can cause a cough. You might cough because of a cold or the flu, bronchitis, or asthma. Smoking, postnasal drip, allergies, and stomach acid that backs up into your throat also can cause coughs. A cough is a symptom, not a disease. Most coughs stop when the cause, such as a cold, goes away. You can take a few steps at home to cough less and feel better. Follow-up care is a key part of your treatment and safety. Be sure to make and go to all appointments, and call your doctor if you are having problems. It's also a good idea to know your test results and keep a list of the medicines you take. How can you care for yourself at home? · Drink lots of water and other fluids. This helps thin the mucus and soothes a dry or sore throat. Honey or lemon juice in hot water or tea may ease a dry cough. · Take cough medicine as directed by your doctor. · Prop up your head on pillows to help you breathe and ease a dry cough. · Try cough drops to soothe a dry or sore throat. Cough drops don't stop a cough. Medicine-flavored cough drops are no better than candy-flavored drops or hard candy. · Do not smoke. Avoid secondhand smoke. If you need help quitting, talk to your doctor about stop-smoking programs and medicines. These can increase your chances of quitting for good. When should you call for help? Call 911 anytime you think you may need emergency care. For example, call if:  ? · You have severe trouble breathing. ?Call your doctor now or seek immediate medical care if:  ? · You cough up blood. ? · You have new or worse trouble breathing. ? · You have a new or higher fever. ? · You have a new rash. ? Watch closely for changes in your health, and be sure to contact your doctor if:  ? · You cough more deeply or more often, especially if you notice more mucus or a change in the color of your mucus. ? · You have new symptoms, such as a sore throat, an earache, or sinus pain. ? · You do not get better as expected. Where can you learn more? Go to http://dilcia-esha.info/. Enter D279 in the search box to learn more about \"Cough: Care Instructions. \"  Current as of: May 12, 2017  Content Version: 11.4  © 4494-6988 [employer]. Care instructions adapted under license by [employer] (which disclaims liability or warranty for this information). If you have questions about a medical condition or this instruction, always ask your healthcare professional. [place] [address] any warranty or liability for your use of this information. Musculoskeletal Chest Pain: Care Instructions  Your Care Instructions    Chest pain is not always a sign that something is wrong with your heart or that you have another serious problem. The doctor thinks your chest pain is caused by strained muscles or ligaments, inflamed chest cartilage, or another problem in your chest, rather than by your heart. You may need more tests to find the cause of your chest pain. Follow-up care is a key part of your treatment and safety. Be sure to make and go to all appointments, and call your doctor if you are having problems. It's also a good idea to know your test results and keep a list of the medicines you take. How can you care for yourself at home? · Take pain medicines exactly as directed. ¨ If the doctor gave you a prescription medicine for pain, take it as prescribed. ¨ If you are not taking a prescription pain medicine, ask your doctor if you can take an over-the-counter medicine. · Rest and protect the sore area. · Stop, change, or take a break from any activity that may be causing your pain or soreness. · Put ice or a cold pack on the sore area for 10 to 20 minutes at a time.  Try to do this every 1 to 2 hours for the next 3 days (when you are awake) or until the swelling goes down. Put a thin cloth between the ice and your skin. · After 2 or 3 days, apply a heating pad set on low or a warm cloth to the area that hurts. Some doctors suggest that you go back and forth between hot and cold. · Do not wrap or tape your ribs for support. This may cause you to take smaller breaths, which could increase your risk of lung problems. · Mentholated creams such as Bengay or Icy Hot may soothe sore muscles. Follow the instructions on the package. · Follow your doctor's instructions for exercising. · Gentle stretching and massage may help you get better faster. Stretch slowly to the point just before pain begins, and hold the stretch for at least 15 to 30 seconds. Do this 3 or 4 times a day. Stretch just after you have applied heat. · As your pain gets better, slowly return to your normal activities. Any increased pain may be a sign that you need to rest a while longer. When should you call for help? Call 911 anytime you think you may need emergency care. For example, call if:  ? · You have chest pain or pressure. This may occur with:  ¨ Sweating. ¨ Shortness of breath. ¨ Nausea or vomiting. ¨ Pain that spreads from the chest to the neck, jaw, or one or both shoulders or arms. ¨ Dizziness or lightheadedness. ¨ A fast or uneven pulse. After calling 911, chew 1 adult-strength aspirin. Wait for an ambulance. Do not try to drive yourself. ? · You have sudden chest pain and shortness of breath, or you cough up blood. ?Call your doctor now or seek immediate medical care if:  ? · You have any trouble breathing. ? · Your chest pain gets worse. ? · Your chest pain occurs consistently with exercise and is relieved by rest.   ? Watch closely for changes in your health, and be sure to contact your doctor if:  ? · Your chest pain does not get better after 1 week. Where can you learn more? Go to http://dilcia-esha.info/.   Enter V293 in the search box to learn more about \"Musculoskeletal Chest Pain: Care Instructions. \"  Current as of: March 20, 2017  Content Version: 11.4  © 5622-0483 [employer]. Care instructions adapted under license by [employer] (which disclaims liability or warranty for this information). If you have questions about a medical condition or this instruction, always ask your healthcare professional. [address] any warranty or liability for your use of this information. Chest Pain: Care Instructions  Your Care Instructions    There are many things that can cause chest pain. Some are not serious and will get better on their own in a few days. But some kinds of chest pain need more testing and treatment. Your doctor may have recommended a follow-up visit in the next 8 to 12 hours. If you are not getting better, you may need more tests or treatment. Even though your doctor has released you, you still need to watch for any problems. The doctor carefully checked you, but sometimes problems can develop later. If you have new symptoms or if your symptoms do not get better, get medical care right away. If you have worse or different chest pain or pressure that lasts more than 5 minutes or you passed out (lost consciousness), call 911 or seek other emergency help right away. A medical visit is only one step in your treatment. Even if you feel better, you still need to do what your doctor recommends, such as going to all suggested follow-up appointments and taking medicines exactly as directed. This will help you recover and help prevent future problems. How can you care for yourself at home? · Rest until you feel better. · Take your medicine exactly as prescribed. Call your doctor if you think you are having a problem with your medicine. · Do not drive after taking a prescription pain medicine. When should you call for help?   Call 911 if:  ? · You passed out (lost consciousness). ? · You have severe difficulty breathing. ? · You have symptoms of a heart attack. These may include:  ¨ Chest pain or pressure, or a strange feeling in your chest.  ¨ Sweating. ¨ Shortness of breath. ¨ Nausea or vomiting. ¨ Pain, pressure, or a strange feeling in your back, neck, jaw, or upper belly or in one or both shoulders or arms. ¨ Lightheadedness or sudden weakness. ¨ A fast or irregular heartbeat. After you call 911, the  may tell you to chew 1 adult-strength or 2 to 4 low-dose aspirin. Wait for an ambulance. Do not try to drive yourself. ?Call your doctor today if:  ? · You have any trouble breathing. ? · Your chest pain gets worse. ? · You are dizzy or lightheaded, or you feel like you may faint. ? · You are not getting better as expected. ? · You are having new or different chest pain. Where can you learn more? Go to http://dilcia-esha.info/. Enter A120 in the search box to learn more about \"Chest Pain: Care Instructions. \"  Current as of: March 20, 2017  Content Version: 11.4  © 8230-0850 [employer]. Care instructions adapted under license by [employer] (which disclaims liability or warranty for this information). If you have questions about a medical condition or this instruction, always ask your healthcare professional. [place] [address] any warranty or liability for your use of this information. Cough: Care Instructions  Your Care Instructions    A cough is your body's response to something that bothers your throat or airways. Many things can cause a cough. You might cough because of a cold or the flu, bronchitis, or asthma. Smoking, postnasal drip, allergies, and stomach acid that backs up into your throat also can cause coughs. A cough is a symptom, not a disease. Most coughs stop when the cause, such as a cold, goes away.  You can take a few steps at home to cough less and feel better. Follow-up care is a key part of your treatment and safety. Be sure to make and go to all appointments, and call your doctor if you are having problems. It's also a good idea to know your test results and keep a list of the medicines you take. How can you care for yourself at home? · Drink lots of water and other fluids. This helps thin the mucus and soothes a dry or sore throat. Honey or lemon juice in hot water or tea may ease a dry cough. · Take cough medicine as directed by your doctor. · Prop up your head on pillows to help you breathe and ease a dry cough. · Try cough drops to soothe a dry or sore throat. Cough drops don't stop a cough. Medicine-flavored cough drops are no better than candy-flavored drops or hard candy. · Do not smoke. Avoid secondhand smoke. If you need help quitting, talk to your doctor about stop-smoking programs and medicines. These can increase your chances of quitting for good. When should you call for help? Call 911 anytime you think you may need emergency care. For example, call if:  ? · You have severe trouble breathing. ?Call your doctor now or seek immediate medical care if:  ? · You cough up blood. ? · You have new or worse trouble breathing. ? · You have a new or higher fever. ? · You have a new rash. ? Watch closely for changes in your health, and be sure to contact your doctor if:  ? · You cough more deeply or more often, especially if you notice more mucus or a change in the color of your mucus. ? · You have new symptoms, such as a sore throat, an earache, or sinus pain. ? · You do not get better as expected. Where can you learn more? Go to http://dilcia-esha.info/. Enter D279 in the search box to learn more about \"Cough: Care Instructions. \"  Current as of: May 12, 2017  Content Version: 11.4  © 2435-0977 [employer].  Care instructions adapted under license by [employer] (which disclaims liability or warranty for this information). If you have questions about a medical condition or this instruction, always ask your healthcare professional. [place] [address] any warranty or liability for your use of this information.

## 2018-04-25 NOTE — ED PROVIDER NOTES
EMERGENCY DEPARTMENT HISTORY AND PHYSICAL EXAM    Date: 4/25/2018  Patient Name: Kelton Sauceda    History of Presenting Illness     Chief Complaint   Patient presents with    Other     pull in the chest    Cough         History Provided By: Patient and Patient's     Chief Complaint: cough, chest pain  Duration: 2 Weeks  Timing:  Acute and Intermittent  Location: central  Quality: Cramping and Sharp  Severity: Moderate  Modifying Factors: none  Associated Symptoms: denies any other associated signs or symptoms      Additional History (Context): Kelton Sauceda is a 43 y.o. female with diabetes, hypertension, hyperlipidemia and obesity who presents with a 'juicy' but not productive cough x 2 weeks, and intermittent flashes of diffuse central CP x 1 week. Pain lasts <20 seconds and she doesn't associate it with breathing or coughing or exertion. Pain occurs twice daily. Denies any recent stress testing; denies prior echocardiogram.  Father had MI in his 52's. Pt not followed by cardiology; has ASA allergy. PCP: Darlene Fabry, MD    Current Facility-Administered Medications   Medication Dose Route Frequency Provider Last Rate Last Dose    guaiFENesin-dextromethorphan (ROBITUSSIN DM) 100-10 mg/5 mL syrup 10 mL  10 mL Oral NOW LETTY Apodaca         Current Outpatient Prescriptions   Medication Sig Dispense Refill    albuterol (PROVENTIL HFA, VENTOLIN HFA, PROAIR HFA) 90 mcg/actuation inhaler Take 1 Puff by inhalation every four (4) hours as needed for Wheezing. 1 Inhaler 0    dextromethorphan-guaiFENesin (ROBITUSSIN-DM)  mg/5 mL syrup Take 10 mL by mouth every six (6) hours as needed for Cough. 240 mL 0    CARVEDILOL PO Take  by mouth.  atorvastatin (LIPITOR) 40 mg tablet Take 40 mg by mouth nightly.  metFORMIN (GLUCOPHAGE) 500 mg tablet Take 500 mg by mouth two (2) times daily (with meals).  spironolactone (ALDACTONE) 100 mg tablet Take 100 mg by mouth daily.       chlorthalidone (HYGROTEN) 25 mg tablet Take 25 mg by mouth daily.  hydrOXYzine HCl (ATARAX) 10 mg tablet Take  by mouth every eight (8) hours as needed for Itching or Anxiety.  ergocalciferol (VITAMIN D2) 50,000 unit capsule Take 50,000 Units by mouth every seven (7) days.  fluticasone-salmeterol (ADVAIR) 250-50 mcg/dose diskus inhaler Take 1 Puff by inhalation two (2) times a day. 1 Inhaler 1    guaiFENesin-codeine (ROBITUSSIN AC) 100-10 mg/5 mL solution Take 5 mL by mouth three (3) times daily as needed for Cough. Max Daily Amount: 15 mL. 90 mL 0       Past History     Past Medical History:  Past Medical History:   Diagnosis Date    Anxiety     Asthma     HTN (hypertension)        Past Surgical History:  No past surgical history on file. Family History:  Family History   Problem Relation Age of Onset   [address] [facility] Ab Stroke Mother     Hypertension Mother     Hypertension Father        Social History:  Social History   Substance Use Topics    Smoking status: Never Smoker    Smokeless tobacco: Not on file    Alcohol use Yes      Comment: socially       Allergies: Allergies   Allergen Reactions    Aspirin Nausea and Vomiting and Swelling         Review of Systems   Review of Systems   Constitutional: Negative for fever. Eyes: Negative. Respiratory: Positive for cough. Cardiovascular: Positive for chest pain. Gastrointestinal: Negative. Endocrine: Negative. Genitourinary: Negative. Musculoskeletal: Negative. Skin: Negative. Allergic/Immunologic: Negative. Neurological: Negative. Hematological: Negative. Psychiatric/Behavioral: Negative. All other systems reviewed and are negative.     All Other Systems Negative  Physical Exam     Vitals:    04/25/18 0215 04/25/18 0245 04/25/18 0300 04/25/18 0315   BP: (!) 151/91 (!) 135/92 (!) 136/92 136/72   Pulse: 77 78 75 78   Resp: 27 28 23 (!) 33   Temp:       SpO2:       Weight:         Physical Exam         Diagnostic Study Results     Labs -     Recent Results (from the past 12 hour(s))   EKG, 12 LEAD, INITIAL    Collection Time: 04/24/18 11:28 PM   Result Value Ref Range    Ventricular Rate 84 BPM    Atrial Rate 84 BPM    P-R Interval 156 ms    QRS Duration 84 ms    Q-T Interval 414 ms    QTC Calculation (Bezet) 489 ms    Calculated P Axis 38 degrees    Calculated R Axis -8 degrees    Calculated T Axis 54 degrees    Diagnosis       Normal sinus rhythm  Nonspecific T wave abnormality  Prolonged QT  Abnormal ECG  When compared with ECG of 14-SEP-2017 17:19,  No significant change was found     CBC WITH AUTOMATED DIFF    Collection Time: 04/25/18 12:03 AM   Result Value Ref Range    WBC 7.1 4.6 - 13.2 K/uL    RBC 5.26 4.20 - 5.30 M/uL    HGB 15.1 12.0 - 16.0 g/dL    HCT 48.4 (H) 35.0 - 45.0 %    MCV 92.0 74.0 - 97.0 FL    MCH 28.7 24.0 - 34.0 PG    MCHC 31.2 31.0 - 37.0 g/dL    RDW 13.2 11.6 - 14.5 %    PLATELET 911 380 - 894 K/uL    MPV 9.9 9.2 - 11.8 FL    NEUTROPHILS 69 40 - 73 %    LYMPHOCYTES 22 21 - 52 %    MONOCYTES 7 3 - 10 %    EOSINOPHILS 2 0 - 5 %    BASOPHILS 0 0 - 2 %    ABS. NEUTROPHILS 4.9 1.8 - 8.0 K/UL    ABS. LYMPHOCYTES 1.6 0.9 - 3.6 K/UL    ABS. MONOCYTES 0.5 0.05 - 1.2 K/UL    ABS. EOSINOPHILS 0.1 0.0 - 0.4 K/UL    ABS. BASOPHILS 0.0 0.0 - 0.1 K/UL    DF AUTOMATED     METABOLIC PANEL, COMPREHENSIVE    Collection Time: 04/25/18 12:03 AM   Result Value Ref Range    Sodium 139 136 - 145 mmol/L    Potassium 3.9 3.5 - 5.5 mmol/L    Chloride 98 (L) 100 - 108 mmol/L    CO2 35 (H) 21 - 32 mmol/L    Anion gap 6 3.0 - 18 mmol/L    Glucose 94 74 - 99 mg/dL    BUN 14 7.0 - 18 MG/DL    Creatinine 1.30 0.6 - 1.3 MG/DL    BUN/Creatinine ratio 11 (L) 12 - 20      GFR est AA 55 (L) >60 ml/min/1.73m2    GFR est non-AA 46 (L) >60 ml/min/1.73m2    Calcium 9.1 8.5 - 10.1 MG/DL    Bilirubin, total 0.3 0.2 - 1.0 MG/DL    ALT (SGPT) 46 13 - 56 U/L    AST (SGOT) 36 15 - 37 U/L    Alk.  phosphatase 96 45 - 117 U/L    Protein, total 9.2 (H) 6.4 - 8.2 g/dL    Albumin 4.0 3.4 - 5.0 g/dL    Globulin 5.2 (H) 2.0 - 4.0 g/dL    A-G Ratio 0.8 0.8 - 1.7     CARDIAC PANEL,(CK, CKMB & TROPONIN)    Collection Time: 04/25/18 12:03 AM   Result Value Ref Range     (H) 26 - 192 U/L    CK - MB 1.2 <3.6 ng/ml    CK-MB Index 0.5 0.0 - 4.0 %    Troponin-I, Qt. <0.02 0.0 - 0.045 NG/ML   HCG QL SERUM    Collection Time: 04/25/18 12:03 AM   Result Value Ref Range    HCG, Ql. NEGATIVE  NEG     CARDIAC PANEL,(CK, CKMB & TROPONIN)    Collection Time: 04/25/18  2:21 AM   Result Value Ref Range     (H) 26 - 192 U/L    CK - MB <1.0 <3.6 ng/ml    CK-MB Index  0.0 - 4.0 %     CALCULATION NOT PERFORMED WHEN RESULT IS BELOW LINEAR LIMIT    Troponin-I, Qt. <0.02 0.0 - 0.045 NG/ML       Radiologic Studies -   XR CHEST PORT    (Results Pending)     CT Results  (Last 48 hours)    None        CXR Results  (Last 48 hours)    None            Medical Decision Making   I am the first provider for this patient. I reviewed the vital signs, available nursing notes, past medical history, past surgical history, family history and social history. Vital Signs-Reviewed the patient's vital signs. Records Reviewed: Nursing Notes    Procedures:  Procedures    Provider Notes (Medical Decision Making): URI; bronchitis; PNA; cardiomyopathy    Has PCP appt this morning. Nothing acute on CXR. D/c home. Two sets of CE negative. MED RECONCILIATION:  Current Facility-Administered Medications   Medication Dose Route Frequency    guaiFENesin-dextromethorphan (ROBITUSSIN DM) 100-10 mg/5 mL syrup 10 mL  10 mL Oral NOW     Current Outpatient Prescriptions   Medication Sig    albuterol (PROVENTIL HFA, VENTOLIN HFA, PROAIR HFA) 90 mcg/actuation inhaler Take 1 Puff by inhalation every four (4) hours as needed for Wheezing.  dextromethorphan-guaiFENesin (ROBITUSSIN-DM)  mg/5 mL syrup Take 10 mL by mouth every six (6) hours as needed for Cough.  CARVEDILOL PO Take  by mouth.     atorvastatin (LIPITOR) 40 mg tablet Take 40 mg by mouth nightly.  metFORMIN (GLUCOPHAGE) 500 mg tablet Take 500 mg by mouth two (2) times daily (with meals).  spironolactone (ALDACTONE) 100 mg tablet Take 100 mg by mouth daily.  chlorthalidone (HYGROTEN) 25 mg tablet Take 25 mg by mouth daily.  hydrOXYzine HCl (ATARAX) 10 mg tablet Take  by mouth every eight (8) hours as needed for Itching or Anxiety.  ergocalciferol (VITAMIN D2) 50,000 unit capsule Take 50,000 Units by mouth every seven (7) days.  fluticasone-salmeterol (ADVAIR) 250-50 mcg/dose diskus inhaler Take 1 Puff by inhalation two (2) times a day.  guaiFENesin-codeine (ROBITUSSIN AC) 100-10 mg/5 mL solution Take 5 mL by mouth three (3) times daily as needed for Cough. Max Daily Amount: 15 mL. Disposition:  home    DISCHARGE NOTE:   3:18 AM    Pt has been reexamined. Patient has no new complaints, changes, or physical findings. Care plan outlined and precautions discussed. Results of cxr were reviewed with the patient. All medications were reviewed with the patient; will d/c home with robitussin and albuterol inhaler. All of pt's questions and concerns were addressed. Patient was instructed and agrees to follow up with PCP today, as well as to return to the ED upon further deterioration. Patient is ready to go home. Follow-up Information     Follow up With Details Comments [address], [place] Go today  Ren[place] [address] [place] [id number] [phone]      [facility] EMERGENCY DEPT  If symptoms worsen return immediately [address]  [phone]          Current Discharge Medication List      START taking these medications    Details   albuterol (PROVENTIL HFA, VENTOLIN HFA, PROAIR HFA) 90 mcg/actuation inhaler Take 1 Puff by inhalation every four (4) hours as needed for Wheezing.   Qty: 1 Inhaler, Refills: 0      dextromethorphan-guaiFENesin (ROBITUSSIN-DM)  mg/5 mL syrup Take 10 mL by mouth every six (6) hours as needed for Cough. Qty: 240 mL, Refills: 0         CONTINUE these medications which have NOT CHANGED    Details   fluticasone-salmeterol (ADVAIR) 250-50 mcg/dose diskus inhaler Take 1 Puff by inhalation two (2) times a day. Qty: 1 Inhaler, Refills: 1                   Diagnosis     Clinical Impression:   1. Chest wall pain    2.  Cough

## 2018-09-03 ENCOUNTER — HOSPITAL ENCOUNTER (EMERGENCY)
Age: 38
Discharge: HOME | End: 2018-09-03
Attending: EMERGENCY MEDICINE
Payer: SELF-PAY

## 2018-09-03 ENCOUNTER — APPOINTMENT (OUTPATIENT)
Dept: GENERAL RADIOLOGY | Age: 38
End: 2018-09-03
Attending: EMERGENCY MEDICINE
Payer: SELF-PAY

## 2018-09-03 VITALS
RESPIRATION RATE: 22 BRPM | HEART RATE: 84 BPM | BODY MASS INDEX: 47.09 KG/M2 | WEIGHT: 293 LBS | OXYGEN SATURATION: 93 % | SYSTOLIC BLOOD PRESSURE: 149 MMHG | DIASTOLIC BLOOD PRESSURE: 93 MMHG | HEIGHT: 66 IN | TEMPERATURE: 98.1 F

## 2018-09-03 DIAGNOSIS — J18.9 PNEUMONIA OF LOWER LOBE DUE TO INFECTIOUS ORGANISM, UNSPECIFIED LATERALITY: Primary | ICD-10-CM

## 2018-09-03 PROCEDURE — 94640 AIRWAY INHALATION TREATMENT: CPT

## 2018-09-03 PROCEDURE — 74011250637 HC RX REV CODE- 250/637: Performed by: EMERGENCY MEDICINE

## 2018-09-03 PROCEDURE — 71046 X-RAY EXAM CHEST 2 VIEWS: CPT

## 2018-09-03 PROCEDURE — 99284 EMERGENCY DEPT VISIT MOD MDM: CPT

## 2018-09-03 PROCEDURE — 77030029684 HC NEB SM VOL KT MONA -A

## 2018-09-03 PROCEDURE — 93005 ELECTROCARDIOGRAM TRACING: CPT

## 2018-09-03 PROCEDURE — 74011000250 HC RX REV CODE- 250: Performed by: EMERGENCY MEDICINE

## 2018-09-03 RX ORDER — IPRATROPIUM BROMIDE 0.5 MG/2.5ML
0.5 SOLUTION RESPIRATORY (INHALATION)
Status: COMPLETED | OUTPATIENT
Start: 2018-09-03 | End: 2018-09-03

## 2018-09-03 RX ORDER — AZITHROMYCIN 250 MG/1
250 TABLET, FILM COATED ORAL SEE ADMIN INSTRUCTIONS
Qty: 6 TAB | Refills: 0 | Status: SHIPPED | OUTPATIENT
Start: 2018-09-03 | End: 2019-09-27

## 2018-09-03 RX ORDER — ALBUTEROL SULFATE 2.5 MG/.5ML
5 SOLUTION RESPIRATORY (INHALATION)
Status: COMPLETED | OUTPATIENT
Start: 2018-09-03 | End: 2018-09-03

## 2018-09-03 RX ORDER — BENZONATATE 100 MG/1
100 CAPSULE ORAL
Status: DISCONTINUED | OUTPATIENT
Start: 2018-09-03 | End: 2018-09-03

## 2018-09-03 RX ORDER — BENZONATATE 200 MG/1
200 CAPSULE ORAL
Qty: 30 CAP | Refills: 0 | Status: SHIPPED | OUTPATIENT
Start: 2018-09-03 | End: 2019-09-27

## 2018-09-03 RX ORDER — BENZONATATE 100 MG/1
200 CAPSULE ORAL
Status: COMPLETED | OUTPATIENT
Start: 2018-09-03 | End: 2018-09-03

## 2018-09-03 RX ORDER — ALBUTEROL SULFATE 90 UG/1
2 AEROSOL, METERED RESPIRATORY (INHALATION)
Qty: 1 INHALER | Refills: 0 | Status: SHIPPED | OUTPATIENT
Start: 2018-09-03

## 2018-09-03 RX ORDER — BENZONATATE 100 MG/1
200 CAPSULE ORAL
Status: DISCONTINUED | OUTPATIENT
Start: 2018-09-03 | End: 2018-09-03 | Stop reason: SDUPTHER

## 2018-09-03 RX ADMIN — BENZONATATE 200 MG: 100 CAPSULE ORAL at 16:25

## 2018-09-03 RX ADMIN — ALBUTEROL SULFATE 5 MG: 2.5 SOLUTION RESPIRATORY (INHALATION) at 16:15

## 2018-09-03 RX ADMIN — IPRATROPIUM BROMIDE 0.5 MG: 0.5 SOLUTION RESPIRATORY (INHALATION) at 16:30

## 2018-09-03 NOTE — ED PROVIDER NOTES
EMERGENCY DEPARTMENT HISTORY AND PHYSICAL EXAM    3:02 PM      Date: 9/3/2018  Patient Name: Kurtis Rain    History of Presenting Illness     Chief Complaint   Patient presents with    Chest Pain    Shortness of Breath    Blurred Vision     x 1 week or 2    Cough         History Provided By: Patient    Chief Complaint: Cough    Duration: 1 Weeks  Timing:  Constant  Location: NA  Quality: Dry  Severity: Moderate  Modifying Factors: None   Associated Symptoms: lightheadedness, chest pain, visual disturbance (blurry)       Additional History (Context): Kurtis Rain is a 43 y.o. female with PMHx of HTN, asthma, anxiety, and diabetes who presents with c/o moderate constant SOB that started 1 week ago with associated Sx of lightheadedness, chest pain, and visual disturbance (blurry). Pt states 1 week ago the cough started and recently she was around someone that was smoking a cigarette and her cough worsened. Pt states this feels like her asthma is exacerbated. She states she feels that all her other Sx are from the cough. Pt is a diabetic and hasn't checked her blood sugar today. Pt states she was given Robitussin mixed with Codeine in the past for these Sx and she had to call an ambulance because she states it made her feel high. Pt denies leg pain or leg swelling. Denies any further complaints or symptoms at the moment. PCP: Urbano Awan MD    Current Outpatient Prescriptions   Medication Sig Dispense Refill    benzonatate (TESSALON) 200 mg capsule Take 1 Cap by mouth three (3) times daily as needed for Cough. 30 Cap 0    albuterol (PROVENTIL HFA, VENTOLIN HFA, PROAIR HFA) 90 mcg/actuation inhaler Take 2 Puffs by inhalation every four (4) hours as needed for Wheezing. 1 Inhaler 0    azithromycin (ZITHROMAX Z-LORRIE) 250 mg tablet Take 1 Tab by mouth See Admin Instructions. 6 Tab 0    CARVEDILOL PO Take  by mouth.  atorvastatin (LIPITOR) 40 mg tablet Take 40 mg by mouth nightly.       metFORMIN (GLUCOPHAGE) 500 mg tablet Take 500 mg by mouth two (2) times daily (with meals).  spironolactone (ALDACTONE) 100 mg tablet Take 100 mg by mouth daily.  chlorthalidone (HYGROTEN) 25 mg tablet Take 25 mg by mouth daily.  hydrOXYzine HCl (ATARAX) 10 mg tablet Take  by mouth every eight (8) hours as needed for Itching or Anxiety.  ergocalciferol (VITAMIN D2) 50,000 unit capsule Take 50,000 Units by mouth every seven (7) days.  fluticasone-salmeterol (ADVAIR) 250-50 mcg/dose diskus inhaler Take 1 Puff by inhalation two (2) times a day. 1 Inhaler 1       Past History     Past Medical History:  Past Medical History:   Diagnosis Date    Anxiety     Asthma     Diabetes ([address]     HTN (hypertension)        Past Surgical History:  History reviewed. No pertinent surgical history. Family History:  Family History   Problem Relation Age of Onset   Ravinder Ferreira Stroke Mother     Hypertension Mother     Hypertension Father        Social History:  Social History   Substance Use Topics    Smoking status: Former Smoker    Smokeless tobacco: Former User    Alcohol use Yes      Comment: socially       Allergies: Allergies   Allergen Reactions    Aspirin Nausea and Vomiting and Swelling         Review of Systems       Review of Systems   Eyes: Positive for visual disturbance (blurry). Respiratory: Positive for cough. Cardiovascular: Positive for chest pain. Neurological: Positive for light-headedness. All other systems reviewed and are negative. Physical Exam     Patient Vitals for the past 12 hrs:   Temp Pulse Resp BP SpO2   09/03/18 1429 98.1 °F (36.7 °C) 84 22 (!) 149/93 93 %           Physical Exam   Constitutional: She is oriented to person, place, and time. She appears well-developed. HENT:   Head: Normocephalic and atraumatic. Eyes: Conjunctivae and EOM are normal.   Neck: Normal range of motion. Cardiovascular: Normal heart sounds.   Exam reveals no gallop and no friction rub.    No murmur heard. Pulmonary/Chest: Effort normal. No stridor. She has wheezes in the right upper field and the left upper field. Abdominal: Soft. There is no tenderness. Musculoskeletal: Normal range of motion. She exhibits no tenderness. Neurological: She is alert and oriented to person, place, and time. Skin: Skin is warm and dry. She is not diaphoretic. Psychiatric: She has a normal mood and affect. Her behavior is normal.   Nursing note and vitals reviewed. Diagnostic Study Results     Labs -  Recent Results (from the past 12 hour(s))   EKG, 12 LEAD, INITIAL    Collection Time: 09/03/18  2:44 PM   Result Value Ref Range    Ventricular Rate 79 BPM    Atrial Rate 79 BPM    P-R Interval 148 ms    QRS Duration 88 ms    Q-T Interval 420 ms    QTC Calculation (Bezet) 481 ms    Calculated P Axis 30 degrees    Calculated R Axis -13 degrees    Calculated T Axis 53 degrees    Diagnosis       Normal sinus rhythm  Prolonged QT  Abnormal ECG  When compared with ECG of 24-APR-2018 23:28,  No significant change was found         Radiologic Studies -   XR CHEST PA LAT    (Results Pending)         Medical Decision Making     Provider Notes (Medical Decision Making): Based on my history, physical exam, and diagnostic evaluation, the patient appears to have symptoms consistent with a pneumonia. The vital signs show normal heart rate, BP, and oxygen saturation on room air, with a normal respiratory pattern, and clear lung fields. Pt appears to be in no distress, appears well-hydrated and is ambulating in the emergency department without difficulty. Laboratory data was nondiagnostic. Chest x-ray revealed possible retrocardiac infiltrate. Patient was given nebs and tessalon perles in the emergency department. Pt will be discharged with instructions to return if difficulty breathing, not tolerating oral food or fluids, any respiratory distress, or new symptoms.  I encouraged follow-up with the primary care physician for repeat exam in 24-48 hours to monitor hydration status and respiratory pattern    I am the first provider for this patient. I reviewed the vital signs, available nursing notes, past medical history, past surgical history, family history and social history. Vital Signs-Reviewed the patient's vital signs. Pulse Oximetry Analysis -  93% on room air (Interpretation)    EKG: Interpreted by the EP. Time Interpreted: 14:44   Rate: 79   Rhythm: Normal Sinus Rhythm    Interpretation: QTC of 481, Non-specific ST elevations       Records Reviewed: Nursing Notes (Time of Review: 3:02 PM)    Diagnosis     Clinical Impression:   1. Pneumonia of lower lobe due to infectious organism, unspecified laterality ([address]        Disposition: discharged     Follow-up Information     Follow up With Details Comments Contact Info    Azar Carrillo MD Schedule an appointment as soon as possible for a visit in 1 week As needed, For follow-up [address] [place] [facility] EMERGENCY DEPT Go to As needed, If symptoms worsen [address]  [phone]           Discharge Medication List as of 9/3/2018  5:12 PM      START taking these medications    Details   benzonatate (TESSALON) 200 mg capsule Take 1 Cap by mouth three (3) times daily as needed for Cough. , Print, Disp-30 Cap, R-0      azithromycin (ZITHROMAX Z-LORRIE) 250 mg tablet Take 1 Tab by mouth See Admin Instructions. , Print, Disp-6 Tab, R-0         CONTINUE these medications which have CHANGED    Details   albuterol (PROVENTIL HFA, VENTOLIN HFA, PROAIR HFA) 90 mcg/actuation inhaler Take 2 Puffs by inhalation every four (4) hours as needed for Wheezing., Print, Disp-1 Inhaler, R-0         CONTINUE these medications which have NOT CHANGED    Details   CARVEDILOL PO Take  by mouth., Historical Med      atorvastatin (LIPITOR) 40 mg tablet Take 40 mg by mouth nightly., Historical Med      metFORMIN (GLUCOPHAGE) 500 mg tablet Take 500 mg by mouth two (2) times daily (with meals). , Historical Med      spironolactone (ALDACTONE) 100 mg tablet Take 100 mg by mouth daily. , Historical Med      chlorthalidone (HYGROTEN) 25 mg tablet Take 25 mg by mouth daily. , Historical Med      hydrOXYzine HCl (ATARAX) 10 mg tablet Take  by mouth every eight (8) hours as needed for Itching or Anxiety. , Historical Med      ergocalciferol (VITAMIN D2) 50,000 unit capsule Take 50,000 Units by mouth every seven (7) days. , Historical Med      fluticasone-salmeterol (ADVAIR) 250-50 mcg/dose diskus inhaler Take 1 Puff by inhalation two (2) times a day., Print, Disp-1 Inhaler, R-1         STOP taking these medications       dextromethorphan-guaiFENesin (ROBITUSSIN-DM)  mg/5 mL syrup Comments:   Reason for Stopping:         guaiFENesin-codeine (ROBITUSSIN AC) 100-10 mg/5 mL solution Comments:   Reason for Stopping:             _______________________________    Attestations:  Scribe Attestation     Violeta Marks acting as a scribe for and in the presence of Deana Bustillo MD      September 03, 2018 at 3:02 PM       Provider Attestation:      I personally performed the services described in the documentation, reviewed the documentation, as recorded by the scribe in my presence, and it accurately and completely records my words and actions.  September 03, 2018 at 3:02 PM - Deana Bustillo MD    _______________________________

## 2018-09-03 NOTE — Clinical Note
Your evaluation today showed Pneumonia. Please follow up with a doctor as discussed. The evaluation and treatment done today requires that you follow up with a physician for re-evaluation. Medical problems can change over time and symptoms can get wors e or new symptoms can develop over time, therefore, it is important that you follow up as we discussed. Please immediately return to the ER if you have any concerns. Call the ER if you have any questions about what we discussed. At the [facility] we are genuinely concerned about your health and comfort. You may be selected to participate in a patient satisfaction survey mailed to your home. We are excited about the opportunity to learn from your experience so  we may continue to improve. In striving for the very best we believe good is not good enough, but if you rate us as EXCELLENT in all boxes, we have succeeded. We strive to provide EXCELLENT care to you and your family. We appreciate the opportunity to ta ke care of you, and hope you do well.

## 2018-09-03 NOTE — DISCHARGE INSTRUCTIONS
Pneumonia: Care Instructions  Your Care Instructions    Pneumonia is an infection of the lungs. Most cases are caused by infections from bacteria or viruses. Pneumonia may be mild or very severe. If it is caused by bacteria, you will be treated with antibiotics. It may take a few weeks to a few months to recover fully from pneumonia, depending on how sick you were and whether your overall health is good. Follow-up care is a key part of your treatment and safety. Be sure to make and go to all appointments, and call your doctor if you are having problems. It's also a good idea to know your test results and keep a list of the medicines you take. How can you care for yourself at home? · Take your antibiotics exactly as directed. Do not stop taking the medicine just because you are feeling better. You need to take the full course of antibiotics. · Take your medicines exactly as prescribed. Call your doctor if you think you are having a problem with your medicine. · Get plenty of rest and sleep. You may feel weak and tired for a while, but your energy level will improve with time. · To prevent dehydration, drink plenty of fluids, enough so that your urine is light yellow or clear like water. Choose water and other caffeine-free clear liquids until you feel better. If you have kidney, heart, or liver disease and have to limit fluids, talk with your doctor before you increase the amount of fluids you drink. · Take care of your cough so you can rest. A cough that brings up mucus from your lungs is common with pneumonia. It is one way your body gets rid of the infection. But if coughing keeps you from resting or causes severe fatigue and chest-wall pain, talk to your doctor. He or she may suggest that you take a medicine to reduce the cough. · Use a vaporizer or humidifier to add moisture to your bedroom. Follow the directions for cleaning the machine. · Do not smoke or allow others to smoke around you.  Smoke will make your cough last longer. If you need help quitting, talk to your doctor about stop-smoking programs and medicines. These can increase your chances of quitting for good. · Take an over-the-counter pain medicine, such as acetaminophen (Tylenol), ibuprofen (Advil, Motrin), or naproxen (Aleve). Read and follow all instructions on the label. · Do not take two or more pain medicines at the same time unless the doctor told you to. Many pain medicines have acetaminophen, which is Tylenol. Too much acetaminophen (Tylenol) can be harmful. · If you were given a spirometer to measure how well your lungs are working, use it as instructed. This can help your doctor tell how your recovery is going. · To prevent pneumonia in the future, talk to your doctor about getting a flu vaccine (once a year) and a pneumococcal vaccine (one time only for most people). When should you call for help? Call 911 anytime you think you may need emergency care. For example, call if:    · You have severe trouble breathing.    Call your doctor now or seek immediate medical care if:    · You cough up dark brown or bloody mucus (sputum).     · You have new or worse trouble breathing.     · You are dizzy or lightheaded, or you feel like you may faint.    Watch closely for changes in your health, and be sure to contact your doctor if:    · You have a new or higher fever.     · You are coughing more deeply or more often.     · You are not getting better after 2 days (48 hours).     · You do not get better as expected. Where can you learn more? Go to http://dilcia-esha.info/. Enter [id number] in the search box to learn more about \"Pneumonia: Care Instructions. \"  Current as of: December 6, 2017  Content Version: 11.7  © 5533-2390 [email or web address]. Care instructions adapted under license by [employer] (which disclaims liability or warranty for this information).  If you have questions about a medical condition or this instruction, always ask your healthcare professional. [place] [address] any warranty or liability for your use of this information.

## 2018-09-04 LAB
ATRIAL RATE: 79 BPM
CALCULATED P AXIS, ECG09: 30 DEGREES
CALCULATED R AXIS, ECG10: -13 DEGREES
CALCULATED T AXIS, ECG11: 53 DEGREES
DIAGNOSIS, 93000: NORMAL
P-R INTERVAL, ECG05: 148 MS
Q-T INTERVAL, ECG07: 420 MS
QRS DURATION, ECG06: 88 MS
QTC CALCULATION (BEZET), ECG08: 481 MS
VENTRICULAR RATE, ECG03: 79 BPM

## 2018-11-09 ENCOUNTER — HOSPITAL ENCOUNTER (EMERGENCY)
Age: 38
Discharge: HOME | End: 2018-11-09
Attending: EMERGENCY MEDICINE
Payer: SELF-PAY

## 2018-11-09 VITALS
HEART RATE: 80 BPM | SYSTOLIC BLOOD PRESSURE: 131 MMHG | WEIGHT: 293 LBS | RESPIRATION RATE: 20 BRPM | OXYGEN SATURATION: 99 % | BODY MASS INDEX: 47.09 KG/M2 | HEIGHT: 66 IN | DIASTOLIC BLOOD PRESSURE: 88 MMHG | TEMPERATURE: 98.7 F

## 2018-11-09 DIAGNOSIS — R55 SYNCOPE AND COLLAPSE: Primary | ICD-10-CM

## 2018-11-09 LAB
ALBUMIN SERPL-MCNC: 3.1 G/DL (ref 3.4–5)
ALBUMIN/GLOB SERPL: 0.8 {RATIO} (ref 0.8–1.7)
ALP SERPL-CCNC: 56 U/L (ref 45–117)
ALT SERPL-CCNC: 21 U/L (ref 13–56)
ANION GAP SERPL CALC-SCNC: 5 MMOL/L (ref 3–18)
APPEARANCE UR: CLEAR
AST SERPL-CCNC: 14 U/L (ref 15–37)
BASOPHILS # BLD: 0 K/UL (ref 0–0.1)
BASOPHILS NFR BLD: 0 % (ref 0–2)
BILIRUB SERPL-MCNC: 0.4 MG/DL (ref 0.2–1)
BILIRUB UR QL: NEGATIVE
BUN SERPL-MCNC: 14 MG/DL (ref 7–18)
BUN/CREAT SERPL: 10 (ref 12–20)
CALCIUM SERPL-MCNC: 8.7 MG/DL (ref 8.5–10.1)
CHLORIDE SERPL-SCNC: 101 MMOL/L (ref 100–108)
CK MB CFR SERPL CALC: NORMAL % (ref 0–4)
CK MB SERPL-MCNC: <1 NG/ML (ref 5–25)
CK SERPL-CCNC: 152 U/L (ref 26–192)
CO2 SERPL-SCNC: 34 MMOL/L (ref 21–32)
COLOR UR: YELLOW
CREAT SERPL-MCNC: 1.35 MG/DL (ref 0.6–1.3)
DIFFERENTIAL METHOD BLD: NORMAL
EOSINOPHIL # BLD: 0.1 K/UL (ref 0–0.4)
EOSINOPHIL NFR BLD: 2 % (ref 0–5)
ERYTHROCYTE [DISTWIDTH] IN BLOOD BY AUTOMATED COUNT: 12.9 % (ref 11.6–14.5)
GLOBULIN SER CALC-MCNC: 4 G/DL (ref 2–4)
GLUCOSE SERPL-MCNC: 125 MG/DL (ref 74–99)
GLUCOSE UR STRIP.AUTO-MCNC: NEGATIVE MG/DL
HCG UR QL: NEGATIVE
HCT VFR BLD AUTO: 44.2 % (ref 35–45)
HGB BLD-MCNC: 13.9 G/DL (ref 12–16)
HGB UR QL STRIP: NEGATIVE
KETONES UR QL STRIP.AUTO: NEGATIVE MG/DL
LEUKOCYTE ESTERASE UR QL STRIP.AUTO: NEGATIVE
LYMPHOCYTES # BLD: 1.6 K/UL (ref 0.9–3.6)
LYMPHOCYTES NFR BLD: 33 % (ref 21–52)
MAGNESIUM SERPL-MCNC: 2 MG/DL (ref 1.6–2.6)
MCH RBC QN AUTO: 28.3 PG (ref 24–34)
MCHC RBC AUTO-ENTMCNC: 31.4 G/DL (ref 31–37)
MCV RBC AUTO: 90 FL (ref 74–97)
MONOCYTES # BLD: 0.5 K/UL (ref 0.05–1.2)
MONOCYTES NFR BLD: 10 % (ref 3–10)
NEUTS SEG # BLD: 2.6 K/UL (ref 1.8–8)
NEUTS SEG NFR BLD: 55 % (ref 40–73)
NITRITE UR QL STRIP.AUTO: NEGATIVE
PH UR STRIP: 7 [PH] (ref 5–8)
PLATELET # BLD AUTO: 251 K/UL (ref 135–420)
PMV BLD AUTO: 9.9 FL (ref 9.2–11.8)
POTASSIUM SERPL-SCNC: 4 MMOL/L (ref 3.5–5.5)
PROT SERPL-MCNC: 7.1 G/DL (ref 6.4–8.2)
PROT UR STRIP-MCNC: NEGATIVE MG/DL
RBC # BLD AUTO: 4.91 M/UL (ref 4.2–5.3)
SODIUM SERPL-SCNC: 140 MMOL/L (ref 136–145)
SP GR UR REFRACTOMETRY: 1.02 (ref 1–1.03)
TROPONIN I SERPL-MCNC: <0.02 NG/ML (ref 0–0.04)
UROBILINOGEN UR QL STRIP.AUTO: 1 EU/DL (ref 0.2–1)
WBC # BLD AUTO: 4.8 K/UL (ref 4.6–13.2)

## 2018-11-09 PROCEDURE — 81025 URINE PREGNANCY TEST: CPT

## 2018-11-09 PROCEDURE — 93005 ELECTROCARDIOGRAM TRACING: CPT

## 2018-11-09 PROCEDURE — 80053 COMPREHEN METABOLIC PANEL: CPT

## 2018-11-09 PROCEDURE — 85025 COMPLETE CBC W/AUTO DIFF WBC: CPT

## 2018-11-09 PROCEDURE — 99284 EMERGENCY DEPT VISIT MOD MDM: CPT

## 2018-11-09 PROCEDURE — 81003 URINALYSIS AUTO W/O SCOPE: CPT

## 2018-11-09 PROCEDURE — 82553 CREATINE MB FRACTION: CPT

## 2018-11-09 PROCEDURE — 83735 ASSAY OF MAGNESIUM: CPT

## 2018-11-09 NOTE — ED PROVIDER NOTES
EMERGENCY DEPARTMENT HISTORY AND PHYSICAL EXAM 
 
Date: 11/9/2018 Patient Name: Kay Camargo History of Presenting Illness Chief Complaint Patient presents with  Syncope History Provided By: Patient and Patient's  Chief Complaint: syncope Duration: today Timing:  Acute Location: generalized Modifying Factors: she felt like she was going to pass out before she did, significant other caught her as she was fainting and she did not hit her head.  significant other states she didn't come to for about 15 minutes Associated Symptoms: denies chest pain, SOB, NVD, abd pain, headache, significant other denies seizure like activity Additional History (Context): Kay Camargo is a 43 y.o. female with HTN, DM, anxiety, Asthma who presents with medics with C/O syncope just PTA. 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 States she started to feel dizzy and light she was going to black out. She states that she got up to go to the bathroom to try to cool herself down with cool water. States when she got to the bathroom, she began to feel hot and again felt like she was going to pass out. States her significant other asked her if she was ok, she replied and then she doesn't remember what happened after that until she woke up with the medics at her house. Her significant other states that she did not hit her head as she was passing out in the bathroom because he caught her. PCP: Sandy Montenegro MD 
 
Current Outpatient Medications Medication Sig Dispense Refill  benzonatate (TESSALON) 200 mg capsule Take 1 Cap by mouth three (3) times daily as needed for Cough. 30 Cap 0  
 albuterol (PROVENTIL HFA, VENTOLIN HFA, PROAIR HFA) 90 mcg/actuation inhaler Take 2 Puffs by inhalation every four (4) hours as needed for Wheezing. 1 Inhaler 0  
 azithromycin (ZITHROMAX Z-LORRIE) 250 mg tablet Take 1 Tab by mouth See Admin Instructions.  6 Tab 0  
  CARVEDILOL PO Take  by mouth.  atorvastatin (LIPITOR) 40 mg tablet Take 40 mg by mouth nightly.  metFORMIN (GLUCOPHAGE) 500 mg tablet Take 500 mg by mouth two (2) times daily (with meals).  spironolactone (ALDACTONE) 100 mg tablet Take 100 mg by mouth daily.  chlorthalidone (HYGROTEN) 25 mg tablet Take 25 mg by mouth daily.  hydrOXYzine HCl (ATARAX) 10 mg tablet Take  by mouth every eight (8) hours as needed for Itching or Anxiety.  ergocalciferol (VITAMIN D2) 50,000 unit capsule Take 50,000 Units by mouth every seven (7) days.  fluticasone-salmeterol (ADVAIR) 250-50 mcg/dose diskus inhaler Take 1 Puff by inhalation two (2) times a day. 1 Inhaler 1 Past History Past Medical History: 
Past Medical History:  
Diagnosis Date  Anxiety  Asthma  Diabetes ([facility] [address]  HTN (hypertension) Past Surgical History: No past surgical history on file. Family History: 
Family History Problem Relation Age of Onset  Stroke Mother  Hypertension Mother  Hypertension Father Social History: 
Social History Tobacco Use  Smoking status: Former Smoker  Smokeless tobacco: Former User Substance Use Topics  Alcohol use: Yes Comment: socially  Drug use: No  
 
 
Allergies: Allergies Allergen Reactions  Aspirin Nausea and Vomiting and Swelling Review of Systems Review of Systems Constitutional: Negative for chills and fever. Respiratory: Negative for chest tightness, shortness of breath and wheezing. Cardiovascular: Negative for chest pain and palpitations. Gastrointestinal: Negative for abdominal pain, diarrhea, nausea and vomiting. Genitourinary: Negative for dysuria, flank pain and frequency. Musculoskeletal: Negative. Neurological: Positive for syncope. Negative for dizziness, seizures, speech difficulty, weakness, light-headedness and headaches. All other systems reviewed and are negative. Physical Exam  
 
Vitals:  
 11/09/18 1309 11/09/18 1431 11/09/18 1433 11/09/18 1434 BP: 112/80 135/84 126/81 131/88 Pulse: 76 74 78 80 Resp: 15 17 20 20 Temp: 98.7 °F (37.1 °C) SpO2: 100% 98% 94% 99% Weight: (!) 190.5 kg (420 lb) Height: 5' 6\" (1.676 m) Physical Exam  
Constitutional: She is oriented to person, place, and time. She appears well-developed and well-nourished. No distress. HENT:  
Head: Normocephalic and atraumatic. Eyes: EOM are normal. Pupils are equal, round, and reactive to light. Neck: Neck supple. Cardiovascular: Normal rate and regular rhythm. Exam reveals no gallop and no friction rub. No murmur heard. Pulmonary/Chest: Effort normal and breath sounds normal. No respiratory distress. She has no wheezes. She has no rales. Abdominal: Soft. Bowel sounds are normal. She exhibits no distension and no mass. There is no tenderness. There is no rebound and no guarding. Massively obese Musculoskeletal: She exhibits no tenderness. CN 2-12 intact, no pronator drift, normal finger to nose bilaterally, no leg drift, no nystagmus, alert and oriented times 3. Ambulates about the ER without difficulty Lymphadenopathy:  
  She has no cervical adenopathy. Neurological: She is alert and oriented to person, place, and time. No cranial nerve deficit. Skin: Skin is warm and dry. No rash noted. She is not diaphoretic. Psychiatric: She has a normal mood and affect. Her behavior is normal.  
Nursing note and vitals reviewed. Diagnostic Study Results Labs - Recent Results (from the past 12 hour(s)) EKG, 12 LEAD, INITIAL Collection Time: 11/09/18 12:35 PM  
Result Value Ref Range Ventricular Rate 75 BPM  
 Atrial Rate 75 BPM  
 P-R Interval 166 ms  
 QRS Duration 78 ms Q-T Interval 426 ms  
 QTC Calculation (Bezet) 475 ms Calculated P Axis 115 degrees Calculated R Axis -39 degrees Calculated T Axis 35 degrees Diagnosis Normal sinus rhythm Left axis deviation Pulmonary disease pattern Nonspecific T wave abnormality Prolonged QT Abnormal ECG When compared with ECG of 03-SEP-2018 14:44, No significant change was found CBC WITH AUTOMATED DIFF Collection Time: 11/09/18 12:43 PM  
Result Value Ref Range WBC 4.8 4.6 - 13.2 K/uL  
 RBC 4.91 4.20 - 5.30 M/uL  
 HGB 13.9 12.0 - 16.0 g/dL HCT 44.2 35.0 - 45.0 % MCV 90.0 74.0 - 97.0 FL  
 MCH 28.3 24.0 - 34.0 PG  
 MCHC 31.4 31.0 - 37.0 g/dL  
 RDW 12.9 11.6 - 14.5 % PLATELET 581 452 - 833 K/uL MPV 9.9 9.2 - 11.8 FL  
 NEUTROPHILS 55 40 - 73 % LYMPHOCYTES 33 21 - 52 % MONOCYTES 10 3 - 10 % EOSINOPHILS 2 0 - 5 % BASOPHILS 0 0 - 2 %  
 ABS. NEUTROPHILS 2.6 1.8 - 8.0 K/UL  
 ABS. LYMPHOCYTES 1.6 0.9 - 3.6 K/UL  
 ABS. MONOCYTES 0.5 0.05 - 1.2 K/UL  
 ABS. EOSINOPHILS 0.1 0.0 - 0.4 K/UL  
 ABS. BASOPHILS 0.0 0.0 - 0.1 K/UL  
 DF AUTOMATED METABOLIC PANEL, COMPREHENSIVE Collection Time: 11/09/18 12:43 PM  
Result Value Ref Range Sodium 140 136 - 145 mmol/L Potassium 4.0 3.5 - 5.5 mmol/L Chloride 101 100 - 108 mmol/L  
 CO2 34 (H) 21 - 32 mmol/L Anion gap 5 3.0 - 18 mmol/L Glucose 125 (H) 74 - 99 mg/dL BUN 14 7.0 - 18 MG/DL Creatinine 1.35 (H) 0.6 - 1.3 MG/DL  
 BUN/Creatinine ratio 10 (L) 12 - 20 GFR est AA 53 (L) >60 ml/min/1.73m2 GFR est non-AA 44 (L) >60 ml/min/1.73m2 Calcium 8.7 8.5 - 10.1 MG/DL Bilirubin, total 0.4 0.2 - 1.0 MG/DL  
 ALT (SGPT) 21 13 - 56 U/L  
 AST (SGOT) 14 (L) 15 - 37 U/L Alk. phosphatase 56 45 - 117 U/L Protein, total 7.1 6.4 - 8.2 g/dL Albumin 3.1 (L) 3.4 - 5.0 g/dL Globulin 4.0 2.0 - 4.0 g/dL A-G Ratio 0.8 0.8 - 1.7 MAGNESIUM Collection Time: 11/09/18 12:43 PM  
Result Value Ref Range Magnesium 2.0 1.6 - 2.6 mg/dL CARDIAC PANEL,(CK, CKMB & TROPONIN) Collection Time: 11/09/18 12:43 PM  
Result Value Ref Range   26 - 192 U/L  
 CK - MB <1.0 <3.6 ng/ml CK-MB Index  0.0 - 4.0 % CALCULATION NOT PERFORMED WHEN RESULT IS BELOW LINEAR LIMIT Troponin-I, Qt. <0.02 0.0 - 0.045 NG/ML  
URINALYSIS W/ RFLX MICROSCOPIC Collection Time: 11/09/18  2:41 PM  
Result Value Ref Range Color YELLOW Appearance CLEAR Specific gravity 1.017 1.005 - 1.030    
 pH (UA) 7.0 5.0 - 8.0 Protein NEGATIVE  NEG mg/dL Glucose NEGATIVE  NEG mg/dL Ketone NEGATIVE  NEG mg/dL Bilirubin NEGATIVE  NEG Blood NEGATIVE  NEG Urobilinogen 1.0 0.2 - 1.0 EU/dL Nitrites NEGATIVE  NEG Leukocyte Esterase NEGATIVE  NEG    
HCG URINE, QL Collection Time: 11/09/18  2:41 PM  
Result Value Ref Range HCG urine, QL NEGATIVE  NEG Radiologic Studies - No orders to display CT Results  (Last 48 hours) None CXR Results  (Last 48 hours) None Medical Decision Making I am the first provider for this patient. I reviewed the vital signs, available nursing notes, past medical history, past surgical history, family history and social history. Vital Signs-Reviewed the patient's vital signs. EKG: Interpreted by the EP and [facility] Time Interpreted: 4404 Rate:  75 Rhythm: normal sinus rhythm Interpretation: No STEMI, prolonged QT, non specific T wave abnormality, not different or unchanged from prior Comparison: Sep 2018 Records Reviewed: Nursing Notes, Old Medical Records, Previous electrocardiograms, Previous Radiology Studies and Previous Laboratory Studies Procedures: 
Procedures Provider Notes (Medical Decision Making): Impression:  Syncope. Labs, orthostatics, EKG are reassuring. Will have patient follow up with PCP closely. Urged to return if her symptoms worsen at all. She is fully neurologically intact, has ambulated about the ER, and feels better. She would like to go home.  Patricia [place] 
 
MED RECONCILIATION: 
 No current facility-administered medications for this encounter. Current Outpatient Medications Medication Sig  
 benzonatate (TESSALON) 200 mg capsule Take 1 Cap by mouth three (3) times daily as needed for Cough.  albuterol (PROVENTIL HFA, VENTOLIN HFA, PROAIR HFA) 90 mcg/actuation inhaler Take 2 Puffs by inhalation every four (4) hours as needed for Wheezing.  azithromycin (ZITHROMAX Z-LORRIE) 250 mg tablet Take 1 Tab by mouth See Admin Instructions.  CARVEDILOL PO Take  by mouth.  atorvastatin (LIPITOR) 40 mg tablet Take 40 mg by mouth nightly.  metFORMIN (GLUCOPHAGE) 500 mg tablet Take 500 mg by mouth two (2) times daily (with meals).  spironolactone (ALDACTONE) 100 mg tablet Take 100 mg by mouth daily.  chlorthalidone (HYGROTEN) 25 mg tablet Take 25 mg by mouth daily.  hydrOXYzine HCl (ATARAX) 10 mg tablet Take  by mouth every eight (8) hours as needed for Itching or Anxiety.  ergocalciferol (VITAMIN D2) 50,000 unit capsule Take 50,000 Units by mouth every seven (7) days.  fluticasone-salmeterol (ADVAIR) 250-50 mcg/dose diskus inhaler Take 1 Puff by inhalation two (2) times a day. Disposition: 
Discharged DISCHARGE NOTE:  
 
Pt has been reexamined. Patient has no new complaints, changes, or physical findings. Care plan outlined and precautions discussed. Results of labs were reviewed with the patient. All medications were reviewed with the patient; will d/c home. All of pt's questions and concerns were addressed. Patient was instructed and agrees to follow up with PCP, as well as to return to the ED upon further deterioration. Patient is ready to go home. Follow-up Information Follow up With Specialties Details Why Contact Info [facility] EMERGENCY DEPT Emergency Medicine  If symptoms worsen [address] [id number] 
[phone] [address]  In 3 days to help you get a new PCP DR. ALEXIS[facility]S [address] [address] [phone] Current Discharge Medication List  
  
CONTINUE these medications which have NOT CHANGED Details  
benzonatate (TESSALON) 200 mg capsule Take 1 Cap by mouth three (3) times daily as needed for Cough. Qty: 30 Cap, Refills: 0  
  
albuterol (PROVENTIL HFA, VENTOLIN HFA, PROAIR HFA) 90 mcg/actuation inhaler Take 2 Puffs by inhalation every four (4) hours as needed for Wheezing. Qty: 1 Inhaler, Refills: 0  
  
azithromycin (ZITHROMAX Z-LORRIE) 250 mg tablet Take 1 Tab by mouth See Admin Instructions. Qty: 6 Tab, Refills: 0  
  
CARVEDILOL PO Take  by mouth. atorvastatin (LIPITOR) 40 mg tablet Take 40 mg by mouth nightly. metFORMIN (GLUCOPHAGE) 500 mg tablet Take 500 mg by mouth two (2) times daily (with meals). spironolactone (ALDACTONE) 100 mg tablet Take 100 mg by mouth daily. chlorthalidone (HYGROTEN) 25 mg tablet Take 25 mg by mouth daily. hydrOXYzine HCl (ATARAX) 10 mg tablet Take  by mouth every eight (8) hours as needed for Itching or Anxiety.  
  
ergocalciferol (VITAMIN D2) 50,000 unit capsule Take 50,000 Units by mouth every seven (7) days. fluticasone-salmeterol (ADVAIR) 250-50 mcg/dose diskus inhaler Take 1 Puff by inhalation two (2) times a day. Qty: 1 Inhaler, Refills: 1 Diagnosis Clinical Impression: 1. Syncope and collapse

## 2018-11-09 NOTE — DISCHARGE INSTRUCTIONS
Fainting: Care Instructions  Your Care Instructions    When you faint, or pass out, you lose consciousness for a short time. A brief drop in blood flow to the brain often causes it. When you fall or lie down, more blood flows to your brain and you regain consciousness. Emotional stress, pain, or overheating--especially if you have been standing--can make you faint. In these cases, fainting is usually not serious. But fainting can be a sign of a more serious problem. Your doctor may want you to have more tests to rule out other causes. The treatment you need depends on the reason why you fainted. The doctor has checked you carefully, but problems can develop later. If you notice any problems or new symptoms, get medical treatment right away. Follow-up care is a key part of your treatment and safety. Be sure to make and go to all appointments, and call your doctor if you are having problems. It's also a good idea to know your test results and keep a list of the medicines you take. How can you care for yourself at home? · Drink plenty of fluids to prevent dehydration. If you have kidney, heart, or liver disease and have to limit fluids, talk with your doctor before you increase your fluid intake. When should you call for help? Call 911 anytime you think you may need emergency care. For example, call if:    · You have symptoms of a heart problem. These may include:  ? Chest pain or pressure. ? Severe trouble breathing. ? A fast or irregular heartbeat. ? Lightheadedness or sudden weakness. ? Coughing up pink, foamy mucus. ? Passing out. After you call 911, the  may tell you to chew 1 adult-strength or 2 to 4 low-dose aspirin. Wait for an ambulance. Do not try to drive yourself.     · You have symptoms of a stroke. These may include:  ? Sudden numbness, tingling, weakness, or loss of movement in your face, arm, or leg, especially on only one side of your body. ? Sudden vision changes.   ? Sudden trouble speaking. ? Sudden confusion or trouble understanding simple statements. ? Sudden problems with walking or balance. ? A sudden, severe headache that is different from past headaches.     · You passed out (lost consciousness) again.    Watch closely for changes in your health, and be sure to contact your doctor if:    · You do not get better as expected. Where can you learn more? Go to http://dilcia-esha.info/. Enter M070 in the search box to learn more about \"Fainting: Care Instructions. \"  Current as of: November 20, 2017  Content Version: 11.8  © 8225-1064 [employer]. Care instructions adapted under license by [employer] (which disclaims liability or warranty for this information). If you have questions about a medical condition or this instruction, always ask your healthcare professional. [address] any warranty or liability for your use of this information.

## 2018-11-09 NOTE — ED TRIAGE NOTES
Pt. Jodie Journey in by medics, per medics, pt. Became dizzy this morning prior to a BM stating she feels \"funny\". Pt. Went to the bathroom, had a BM and had a syncopal episode. Medics also stated pt. Was a/o and ambulatory on site. On arrival to ED, pt. A/o x 4 in NAD; states \"I feel a little funny\". VSS.

## 2018-11-09 NOTE — ED NOTES
PATIENT WILL FOLLOW UP WITH  FOR [facility] APPLICATION AS WELL AS A FOLLOW UP APPOINTMENT

## 2018-11-10 LAB
ATRIAL RATE: 75 BPM
CALCULATED P AXIS, ECG09: 115 DEGREES
CALCULATED R AXIS, ECG10: -39 DEGREES
CALCULATED T AXIS, ECG11: 35 DEGREES
DIAGNOSIS, 93000: NORMAL
P-R INTERVAL, ECG05: 166 MS
Q-T INTERVAL, ECG07: 426 MS
QRS DURATION, ECG06: 78 MS
QTC CALCULATION (BEZET), ECG08: 475 MS
VENTRICULAR RATE, ECG03: 75 BPM

## 2019-01-28 ENCOUNTER — APPOINTMENT (OUTPATIENT)
Dept: GENERAL RADIOLOGY | Age: 39
End: 2019-01-28
Attending: EMERGENCY MEDICINE
Payer: MEDICAID

## 2019-01-28 ENCOUNTER — HOSPITAL ENCOUNTER (EMERGENCY)
Age: 39
Discharge: HOME | End: 2019-01-28
Attending: EMERGENCY MEDICINE
Payer: MEDICAID

## 2019-01-28 VITALS
TEMPERATURE: 98.5 F | BODY MASS INDEX: 69.08 KG/M2 | RESPIRATION RATE: 18 BRPM | SYSTOLIC BLOOD PRESSURE: 157 MMHG | HEART RATE: 74 BPM | DIASTOLIC BLOOD PRESSURE: 99 MMHG | WEIGHT: 293 LBS | OXYGEN SATURATION: 93 %

## 2019-01-28 DIAGNOSIS — R07.89 ATYPICAL CHEST PAIN: Primary | ICD-10-CM

## 2019-01-28 LAB
ALBUMIN SERPL-MCNC: 3.3 G/DL (ref 3.4–5)
ALBUMIN/GLOB SERPL: 0.8 {RATIO} (ref 0.8–1.7)
ALP SERPL-CCNC: 61 U/L (ref 45–117)
ALT SERPL-CCNC: 20 U/L (ref 13–56)
ANION GAP SERPL CALC-SCNC: 4 MMOL/L (ref 3–18)
AST SERPL-CCNC: 17 U/L (ref 15–37)
ATRIAL RATE: 78 BPM
BASOPHILS # BLD: 0 K/UL (ref 0–0.1)
BASOPHILS NFR BLD: 0 % (ref 0–2)
BILIRUB SERPL-MCNC: 0.3 MG/DL (ref 0.2–1)
BUN SERPL-MCNC: 13 MG/DL (ref 7–18)
BUN/CREAT SERPL: 11 (ref 12–20)
CALCIUM SERPL-MCNC: 8.3 MG/DL (ref 8.5–10.1)
CALCULATED P AXIS, ECG09: 45 DEGREES
CALCULATED R AXIS, ECG10: 3 DEGREES
CALCULATED T AXIS, ECG11: 56 DEGREES
CHLORIDE SERPL-SCNC: 102 MMOL/L (ref 100–108)
CK MB CFR SERPL CALC: NORMAL % (ref 0–4)
CK MB SERPL-MCNC: <1 NG/ML (ref 5–25)
CK SERPL-CCNC: 122 U/L (ref 26–192)
CO2 SERPL-SCNC: 34 MMOL/L (ref 21–32)
CREAT SERPL-MCNC: 1.16 MG/DL (ref 0.6–1.3)
DIAGNOSIS, 93000: NORMAL
DIFFERENTIAL METHOD BLD: NORMAL
EOSINOPHIL # BLD: 0.1 K/UL (ref 0–0.4)
EOSINOPHIL NFR BLD: 2 % (ref 0–5)
ERYTHROCYTE [DISTWIDTH] IN BLOOD BY AUTOMATED COUNT: 14.3 % (ref 11.6–14.5)
GLOBULIN SER CALC-MCNC: 4.1 G/DL (ref 2–4)
GLUCOSE SERPL-MCNC: 112 MG/DL (ref 74–99)
HCG SERPL QL: NEGATIVE
HCT VFR BLD AUTO: 42.8 % (ref 35–45)
HGB BLD-MCNC: 13.3 G/DL (ref 12–16)
LYMPHOCYTES # BLD: 1.6 K/UL (ref 0.9–3.6)
LYMPHOCYTES NFR BLD: 28 % (ref 21–52)
MCH RBC QN AUTO: 28.5 PG (ref 24–34)
MCHC RBC AUTO-ENTMCNC: 31.1 G/DL (ref 31–37)
MCV RBC AUTO: 91.6 FL (ref 74–97)
MONOCYTES # BLD: 0.5 K/UL (ref 0.05–1.2)
MONOCYTES NFR BLD: 9 % (ref 3–10)
NEUTS SEG # BLD: 3.4 K/UL (ref 1.8–8)
NEUTS SEG NFR BLD: 61 % (ref 40–73)
P-R INTERVAL, ECG05: 156 MS
PLATELET # BLD AUTO: 307 K/UL (ref 135–420)
PMV BLD AUTO: 9.4 FL (ref 9.2–11.8)
POTASSIUM SERPL-SCNC: 3.7 MMOL/L (ref 3.5–5.5)
PROT SERPL-MCNC: 7.4 G/DL (ref 6.4–8.2)
Q-T INTERVAL, ECG07: 430 MS
QRS DURATION, ECG06: 82 MS
QTC CALCULATION (BEZET), ECG08: 490 MS
RBC # BLD AUTO: 4.67 M/UL (ref 4.2–5.3)
SODIUM SERPL-SCNC: 140 MMOL/L (ref 136–145)
TROPONIN I SERPL-MCNC: <0.02 NG/ML (ref 0–0.04)
VENTRICULAR RATE, ECG03: 78 BPM
WBC # BLD AUTO: 5.6 K/UL (ref 4.6–13.2)

## 2019-01-28 PROCEDURE — 80053 COMPREHEN METABOLIC PANEL: CPT

## 2019-01-28 PROCEDURE — 96374 THER/PROPH/DIAG INJ IV PUSH: CPT

## 2019-01-28 PROCEDURE — 74011250636 HC RX REV CODE- 250/636: Performed by: EMERGENCY MEDICINE

## 2019-01-28 PROCEDURE — 93005 ELECTROCARDIOGRAM TRACING: CPT

## 2019-01-28 PROCEDURE — 99284 EMERGENCY DEPT VISIT MOD MDM: CPT

## 2019-01-28 PROCEDURE — 71045 X-RAY EXAM CHEST 1 VIEW: CPT

## 2019-01-28 PROCEDURE — 82550 ASSAY OF CK (CPK): CPT

## 2019-01-28 PROCEDURE — 85025 COMPLETE CBC W/AUTO DIFF WBC: CPT

## 2019-01-28 PROCEDURE — 84703 CHORIONIC GONADOTROPIN ASSAY: CPT

## 2019-01-28 RX ORDER — KETOROLAC TROMETHAMINE 30 MG/ML
15 INJECTION, SOLUTION INTRAMUSCULAR; INTRAVENOUS
Status: COMPLETED | OUTPATIENT
Start: 2019-01-28 | End: 2019-01-28

## 2019-01-28 RX ADMIN — KETOROLAC TROMETHAMINE 15 MG: 30 INJECTION INTRAMUSCULAR; INTRAVENOUS at 04:49

## 2019-01-28 NOTE — ED PROVIDER NOTES
Jorge Alberto Gil is a 42 y/o female with PMHx of anxiety, asthma, DM, HTN who presents with c/o acute and worsening chest pain and tightness going from side-to-side, mainly in R side onset x2 days ago. She confirms associated sx of cough, and mild SOB with sx tonight and states put CPAP machine (for sleep apnea) on to help her breath with relief; no N/V or diaphoresis noted. She denies any smoking, PMHx of cardiac issues, DVT or PE. She does confirm her father had an MI in his 52's although unsure of exact age. She states mild pain upon presentation mostly in right side and states it sometimes radiates into shoulder. No other concerns or symptoms at this time. Past Medical History:  
Diagnosis Date  Anxiety  Asthma  Diabetes ([facility] [address]  HTN (hypertension) History reviewed. No pertinent surgical history. Family History:  
Problem Relation Age of Onset  Stroke Mother  Hypertension Mother  Hypertension Father Social History Socioeconomic History  Marital status: SINGLE Spouse name: Not on file  Number of children: Not on file  Years of education: Not on file  Highest education level: Not on file Social Needs  Financial resource strain: Not on file  Food insecurity - worry: Not on file  Food insecurity - inability: Not on file  Transportation needs - medical: Not on file  Transportation needs - non-medical: Not on file Occupational History  Not on file Tobacco Use  Smoking status: Former Smoker  Smokeless tobacco: Former User Substance and Sexual Activity  Alcohol use: Yes Comment: socially  Drug use: No  
 Sexual activity: Yes  
  Partners: Male Other Topics Concern  Not on file Social History Narrative  Not on file ALLERGIES: Aspirin Review of Systems Constitutional: Negative. Negative for chills and fever. HENT: Negative. Negative for congestion. Eyes: Negative. Negative for visual disturbance. Respiratory: Positive for cough, chest tightness and shortness of breath. Cardiovascular: Positive for chest pain. Negative for leg swelling. Gastrointestinal: Negative. Negative for abdominal pain, diarrhea and vomiting. Genitourinary: Negative. Negative for difficulty urinating, dysuria and vaginal discharge. Musculoskeletal: Negative. Negative for back pain and myalgias. Skin: Negative. Negative for rash and wound. Neurological: Negative. Negative for dizziness, weakness and light-headedness. Psychiatric/Behavioral: Negative. Negative for suicidal ideas. All other systems reviewed and are negative. Vitals:  
 01/28/19 0235 01/28/19 0300 01/28/19 0315 BP: (!) 175/103 (!) 148/100 Pulse: 83 82 79 Resp: 18 Temp: 98.5 °F (36.9 °C) SpO2: 95% 97% 97% Weight: (!) 194.1 kg (428 lb) Physical Exam  
Constitutional: She is oriented to person, place, and time. No distress. Morbidly obese. HENT:  
Head: Normocephalic and atraumatic. Mouth/Throat: Oropharynx is clear and moist.  
Eyes: Conjunctivae and EOM are normal. Pupils are equal, round, and reactive to light. No scleral icterus. Neck: Trachea normal and normal range of motion. Neck supple. No JVD present. No thyromegaly present. Cardiovascular: Normal rate, regular rhythm, S1 normal and S2 normal. Exam reveals no gallop and no friction rub. No murmur heard. Pulmonary/Chest: Effort normal and breath sounds normal. No accessory muscle usage. No respiratory distress. Abdominal: Soft. Normal appearance. She exhibits no distension. There is no tenderness. There is no rigidity, no rebound and no guarding. Musculoskeletal: Normal range of motion. She exhibits no edema or tenderness. Neurological: She is alert and oriented to person, place, and time. She has normal strength. No cranial nerve deficit or sensory deficit.  Coordination normal.  
 Skin: Skin is warm and intact. No rash noted. Psychiatric: She has a normal mood and affect. Her speech is normal and behavior is normal.  
Vitals reviewed. MDM Number of Diagnoses or Management Options Atypical chest pain:  
Diagnosis management comments: Jorge Alberto Gil is a 43 y.o. Female coming in with CP intermittently, but all day today. PERC negative. CXR clear. Trop negative. EKG reassuring. No concern for arotic pathology. Vitals: 
Patient Vitals for the past 12 hrs: 
 Temp Pulse Resp BP SpO2  
01/28/19 0315  79   97 % 01/28/19 0300  82  (!) 148/100 97 % 01/28/19 0235 98.5 °F (36.9 °C) 83 18 (!) 175/103 95 % Medications Ordered: 
Medications - No data to display Lab Findings: 
Recent Results (from the past 12 hour(s)) CBC WITH AUTOMATED DIFF Collection Time: 01/28/19  2:58 AM  
Result Value Ref Range WBC 5.6 4.6 - 13.2 K/uL  
 RBC 4.67 4.20 - 5.30 M/uL  
 HGB 13.3 12.0 - 16.0 g/dL HCT 42.8 35.0 - 45.0 % MCV 91.6 74.0 - 97.0 FL  
 MCH 28.5 24.0 - 34.0 PG  
 MCHC 31.1 31.0 - 37.0 g/dL  
 RDW 14.3 11.6 - 14.5 % PLATELET 544 034 - 974 K/uL MPV 9.4 9.2 - 11.8 FL  
 NEUTROPHILS 61 40 - 73 % LYMPHOCYTES 28 21 - 52 % MONOCYTES 9 3 - 10 % EOSINOPHILS 2 0 - 5 % BASOPHILS 0 0 - 2 %  
 ABS. NEUTROPHILS 3.4 1.8 - 8.0 K/UL  
 ABS. LYMPHOCYTES 1.6 0.9 - 3.6 K/UL  
 ABS. MONOCYTES 0.5 0.05 - 1.2 K/UL  
 ABS. EOSINOPHILS 0.1 0.0 - 0.4 K/UL  
 ABS. BASOPHILS 0.0 0.0 - 0.1 K/UL  
 DF AUTOMATED METABOLIC PANEL, COMPREHENSIVE Collection Time: 01/28/19  2:58 AM  
Result Value Ref Range Sodium 140 136 - 145 mmol/L Potassium 3.7 3.5 - 5.5 mmol/L Chloride 102 100 - 108 mmol/L  
 CO2 34 (H) 21 - 32 mmol/L Anion gap 4 3.0 - 18 mmol/L Glucose 112 (H) 74 - 99 mg/dL BUN 13 7.0 - 18 MG/DL Creatinine 1.16 0.6 - 1.3 MG/DL  
 BUN/Creatinine ratio 11 (L) 12 - 20 GFR est AA >60 >60 ml/min/1.73m2 GFR est non-AA 52 (L) >60 ml/min/1.73m2 Calcium 8.3 (L) 8.5 - 10.1 MG/DL Bilirubin, total PENDING MG/DL  
 ALT (SGPT) PENDING U/L  
 AST (SGOT) PENDING U/L Alk. phosphatase PENDING U/L Protein, total PENDING g/dL Albumin 3.3 (L) 3.4 - 5.0 g/dL Globulin PENDING g/dL A-G Ratio PENDING    
HCG QL SERUM Collection Time: 01/28/19  2:58 AM  
Result Value Ref Range HCG, Ql. NEGATIVE  NEG    
 
 
EKG Interpretation by ED physician: 
 
Time: 3:26 AM 
Rhythm: sinus rhythm Rate: 78 bpm 
Interpretation: no acute ischemic changes. EKG interpret by Julio Cesar Gonzalez MD 
 
Reevaluation of the patient:  
Discussed all results, follow up and return precautions. Diagnosis: 1. Atypical chest pain Disposition: Discharged Follow-up Information None Medication List  
  
ASK your doctor about these medications   
albuterol 90 mcg/actuation inhaler Commonly known as:  PROVENTIL HFA, VENTOLIN HFA, PROAIR HFA Take 2 Puffs by inhalation every four (4) hours as needed for Wheezing. azithromycin 250 mg tablet Commonly known as:  [place] Take 1 Tab by mouth See Admin Instructions. benzonatate 200 mg capsule Commonly known as:  TESSALON Take 1 Cap by mouth three (3) times daily as needed for Cough. CARVEDILOL PO 
  
chlorthalidone 25 mg tablet Commonly known as:  HYGROTEN 
  
fluticasone-salmeterol 250-50 mcg/dose diskus inhaler Commonly known as:  ADVAIR Take 1 Puff by inhalation two (2) times a day. 
  
hydrOXYzine HCl 10 mg tablet Commonly known as:  ATARAX 
  
LIPITOR 40 mg tablet Generic drug:  atorvastatin 
  
metFORMIN 500 mg tablet Commonly known as:  GLUCOPHAGE 
  
spironolactone 100 mg tablet Commonly known as:  ALDACTONE 
  
VITAMIN D2 50,000 unit capsule Generic drug:  ergocalciferol Scribe Attestation Chao Urias acting as a scribe for and in the presence of Julio Cesar Gonzalez MD     
January 28, 2019 at 3:05 AM 
    
Provider Attestation: I personally performed the services described in the documentation, reviewed the documentation, as recorded by the scribe in my presence, and it accurately and completely records my words and actions.  January 28, 2019 at 3:05 AM - Shannon Lofton MD

## 2019-01-28 NOTE — ED TRIAGE NOTES
Pt c/o R sided chest pain that goes across from R to L x 2 days. Pt states that laying down helps it feel better. Pt states she attempted to put on her BIPAP but felt like she was unable to breathe so she stopped.

## 2019-08-01 ENCOUNTER — APPOINTMENT (OUTPATIENT)
Dept: GENERAL RADIOLOGY | Age: 39
End: 2019-08-01
Attending: EMERGENCY MEDICINE
Payer: MEDICAID

## 2019-08-01 ENCOUNTER — HOSPITAL ENCOUNTER (EMERGENCY)
Age: 39
Discharge: HOME | End: 2019-08-01
Attending: EMERGENCY MEDICINE
Payer: MEDICAID

## 2019-08-01 VITALS
OXYGEN SATURATION: 95 % | WEIGHT: 293 LBS | SYSTOLIC BLOOD PRESSURE: 134 MMHG | DIASTOLIC BLOOD PRESSURE: 85 MMHG | HEIGHT: 65 IN | RESPIRATION RATE: 16 BRPM | TEMPERATURE: 98.2 F | HEART RATE: 77 BPM | BODY MASS INDEX: 48.82 KG/M2

## 2019-08-01 DIAGNOSIS — R07.89 ATYPICAL CHEST PAIN: Primary | ICD-10-CM

## 2019-08-01 LAB
ALBUMIN SERPL-MCNC: 3.6 G/DL (ref 3.4–5)
ALBUMIN/GLOB SERPL: 0.9 {RATIO} (ref 0.8–1.7)
ALP SERPL-CCNC: 61 U/L (ref 45–117)
ALT SERPL-CCNC: 23 U/L (ref 13–56)
ANION GAP SERPL CALC-SCNC: 3 MMOL/L (ref 3–18)
AST SERPL-CCNC: 21 U/L (ref 10–38)
ATRIAL RATE: 72 BPM
BASOPHILS # BLD: 0 K/UL (ref 0–0.1)
BASOPHILS NFR BLD: 0 % (ref 0–2)
BILIRUB SERPL-MCNC: 0.6 MG/DL (ref 0.2–1)
BUN SERPL-MCNC: 18 MG/DL (ref 7–18)
BUN/CREAT SERPL: 14 (ref 12–20)
CALCIUM SERPL-MCNC: 9.3 MG/DL (ref 8.5–10.1)
CALCULATED P AXIS, ECG09: 53 DEGREES
CALCULATED R AXIS, ECG10: -12 DEGREES
CALCULATED T AXIS, ECG11: 38 DEGREES
CHLORIDE SERPL-SCNC: 97 MMOL/L (ref 100–111)
CK MB CFR SERPL CALC: 0.6 % (ref 0–4)
CK MB SERPL-MCNC: 1.5 NG/ML (ref 5–25)
CK SERPL-CCNC: 266 U/L (ref 26–192)
CO2 SERPL-SCNC: 37 MMOL/L (ref 21–32)
CREAT SERPL-MCNC: 1.26 MG/DL (ref 0.6–1.3)
DIAGNOSIS, 93000: NORMAL
DIFFERENTIAL METHOD BLD: NORMAL
EOSINOPHIL # BLD: 0.2 K/UL (ref 0–0.4)
EOSINOPHIL NFR BLD: 3 % (ref 0–5)
ERYTHROCYTE [DISTWIDTH] IN BLOOD BY AUTOMATED COUNT: 12.8 % (ref 11.6–14.5)
GLOBULIN SER CALC-MCNC: 4.1 G/DL (ref 2–4)
GLUCOSE SERPL-MCNC: 110 MG/DL (ref 74–99)
HCT VFR BLD AUTO: 42.6 % (ref 35–45)
HGB BLD-MCNC: 13.5 G/DL (ref 12–16)
LYMPHOCYTES # BLD: 2 K/UL (ref 0.9–3.6)
LYMPHOCYTES NFR BLD: 43 % (ref 21–52)
MCH RBC QN AUTO: 28.8 PG (ref 24–34)
MCHC RBC AUTO-ENTMCNC: 31.7 G/DL (ref 31–37)
MCV RBC AUTO: 91 FL (ref 74–97)
MONOCYTES # BLD: 0.4 K/UL (ref 0.05–1.2)
MONOCYTES NFR BLD: 9 % (ref 3–10)
NEUTS SEG # BLD: 2.1 K/UL (ref 1.8–8)
NEUTS SEG NFR BLD: 45 % (ref 40–73)
P-R INTERVAL, ECG05: 170 MS
PLATELET # BLD AUTO: 348 K/UL (ref 135–420)
PMV BLD AUTO: 9.6 FL (ref 9.2–11.8)
POTASSIUM SERPL-SCNC: 3.5 MMOL/L (ref 3.5–5.5)
PROT SERPL-MCNC: 7.7 G/DL (ref 6.4–8.2)
Q-T INTERVAL, ECG07: 438 MS
QRS DURATION, ECG06: 88 MS
QTC CALCULATION (BEZET), ECG08: 479 MS
RBC # BLD AUTO: 4.68 M/UL (ref 4.2–5.3)
SODIUM SERPL-SCNC: 137 MMOL/L (ref 136–145)
TROPONIN I SERPL-MCNC: <0.02 NG/ML (ref 0–0.04)
TROPONIN I SERPL-MCNC: <0.02 NG/ML (ref 0–0.04)
VENTRICULAR RATE, ECG03: 72 BPM
WBC # BLD AUTO: 4.7 K/UL (ref 4.6–13.2)

## 2019-08-01 PROCEDURE — 80053 COMPREHEN METABOLIC PANEL: CPT

## 2019-08-01 PROCEDURE — 93005 ELECTROCARDIOGRAM TRACING: CPT

## 2019-08-01 PROCEDURE — 85025 COMPLETE CBC W/AUTO DIFF WBC: CPT

## 2019-08-01 PROCEDURE — 99282 EMERGENCY DEPT VISIT SF MDM: CPT

## 2019-08-01 PROCEDURE — 82550 ASSAY OF CK (CPK): CPT

## 2019-08-01 PROCEDURE — 71046 X-RAY EXAM CHEST 2 VIEWS: CPT

## 2019-08-01 NOTE — DISCHARGE INSTRUCTIONS

## 2019-08-01 NOTE — ED PROVIDER NOTES
EMERGENCY DEPARTMENT HISTORY AND PHYSICAL EXAM    4:32 PM  Date: 8/1/2019  Patient Name: Klarissa Najera    History of Presenting Illness     Chief Complaint   Patient presents with    Palpitations        History Provided By: Patient    HPI: Klarissa Najera is a 35 y.o. female with pmhx of HTN, diabetes and asthma presents with 3 days of intermittent chest pain pain. Chest pain is dull, left-sided, 2-3 out of 10 severity, no alleviating or exacerbating factors. Last time the patient had pain was around noon. On further questioning patient denies any palpitations. No shortness of breath, no fever, no cough. No smoking, no drinking. PCP: Simone Marie MD    Past History     Past Medical History:  Past Medical History:   Diagnosis Date    Anxiety     Asthma     Diabetes ([address]     HTN (hypertension)        Past Surgical History:  No past surgical history on file. Family History:  Family History   Problem Relation Age of Onset   [facility] Stroke Mother     Hypertension Mother     Hypertension Father        Social History:  Social History     Tobacco Use    Smoking status: Former Smoker    Smokeless tobacco: Former User   Substance Use Topics    Alcohol use: Yes     Comment: socially    Drug use: No       Allergies: Allergies   Allergen Reactions    Aspirin Nausea and Vomiting and Swelling       Review of Systems   Review of Systems   Constitutional: Negative for activity change, appetite change and chills. HENT: Negative for congestion, ear discharge, ear pain and sore throat. Eyes: Negative for photophobia and pain. Respiratory: Negative for cough, choking, shortness of breath, wheezing and stridor. Cardiovascular: Positive for chest pain. Negative for palpitations and leg swelling. Gastrointestinal: Negative for anal bleeding and rectal pain. Endocrine: Negative for polydipsia and polyuria. Genitourinary: Negative for genital sores and urgency.    Musculoskeletal: Negative for arthralgias and myalgias. Neurological: Negative for dizziness, seizures and speech difficulty. Psychiatric/Behavioral: Negative for hallucinations, self-injury and suicidal ideas. Physical Exam     Patient Vitals for the past 12 hrs:   Temp Pulse Resp BP SpO2   08/01/19 1437 98.2 °F (36.8 °C) 77 16 134/85 95 %       Physical Exam   Constitutional: She is oriented to person, place, and time. She appears well-developed. Obese   HENT:   Head: Normocephalic and atraumatic. Eyes: Pupils are equal, round, and reactive to light. EOM are normal.   Neck: Normal range of motion. Cardiovascular: Normal rate, regular rhythm and normal heart sounds. Pulmonary/Chest: Effort normal. No respiratory distress. She has no wheezes. She has rales. She exhibits tenderness. Mild tenderness at  Border of Left sternal angle   Abdominal: Soft. There is no tenderness. There is no rebound. Musculoskeletal: Normal range of motion. She exhibits no edema, tenderness or deformity. Neurological: She is alert and oriented to person, place, and time. No cranial nerve deficit.        Diagnostic Study Results     Labs -  Recent Results (from the past 12 hour(s))   EKG, 12 LEAD, INITIAL    Collection Time: 08/01/19  2:32 PM   Result Value Ref Range    Ventricular Rate 72 BPM    Atrial Rate 72 BPM    P-R Interval 170 ms    QRS Duration 88 ms    Q-T Interval 438 ms    QTC Calculation (Bezet) 479 ms    Calculated P Axis 53 degrees    Calculated R Axis -12 degrees    Calculated T Axis 38 degrees    Diagnosis       Normal sinus rhythm  Nonspecific T wave abnormality  Abnormal ECG  When compared with ECG of 28-JAN-2019 03:07,  No significant change was found  Confirmed by Ashwini Witt MD, [facility] ([id number]) on 8/1/2019 5:16:28 PM     CBC WITH AUTOMATED DIFF    Collection Time: 08/01/19  3:00 PM   Result Value Ref Range    WBC 4.7 4.6 - 13.2 K/uL    RBC 4.68 4.20 - 5.30 M/uL    HGB 13.5 12.0 - 16.0 g/dL    HCT 42.6 35.0 - 45.0 %    MCV 91.0 74.0 - 97.0 FL    MCH 28.8 24.0 - 34.0 PG    MCHC 31.7 31.0 - 37.0 g/dL    RDW 12.8 11.6 - 14.5 %    PLATELET 805 710 - 287 K/uL    MPV 9.6 9.2 - 11.8 FL    NEUTROPHILS 45 40 - 73 %    LYMPHOCYTES 43 21 - 52 %    MONOCYTES 9 3 - 10 %    EOSINOPHILS 3 0 - 5 %    BASOPHILS 0 0 - 2 %    ABS. NEUTROPHILS 2.1 1.8 - 8.0 K/UL    ABS. LYMPHOCYTES 2.0 0.9 - 3.6 K/UL    ABS. MONOCYTES 0.4 0.05 - 1.2 K/UL    ABS. EOSINOPHILS 0.2 0.0 - 0.4 K/UL    ABS. BASOPHILS 0.0 0.0 - 0.1 K/UL    DF AUTOMATED     METABOLIC PANEL, COMPREHENSIVE    Collection Time: 08/01/19  3:00 PM   Result Value Ref Range    Sodium 137 136 - 145 mmol/L    Potassium 3.5 3.5 - 5.5 mmol/L    Chloride 97 (L) 100 - 111 mmol/L    CO2 37 (H) 21 - 32 mmol/L    Anion gap 3 3.0 - 18 mmol/L    Glucose 110 (H) 74 - 99 mg/dL    BUN 18 7.0 - 18 MG/DL    Creatinine 1.26 0.6 - 1.3 MG/DL    BUN/Creatinine ratio 14 12 - 20      GFR est AA 57 (L) >60 ml/min/1.73m2    GFR est non-AA 47 (L) >60 ml/min/1.73m2    Calcium 9.3 8.5 - 10.1 MG/DL    Bilirubin, total 0.6 0.2 - 1.0 MG/DL    ALT (SGPT) 23 13 - 56 U/L    AST (SGOT) 21 10 - 38 U/L    Alk. phosphatase 61 45 - 117 U/L    Protein, total 7.7 6.4 - 8.2 g/dL    Albumin 3.6 3.4 - 5.0 g/dL    Globulin 4.1 (H) 2.0 - 4.0 g/dL    A-G Ratio 0.9 0.8 - 1.7     CARDIAC PANEL,(CK, CKMB & TROPONIN)    Collection Time: 08/01/19  3:00 PM   Result Value Ref Range     (H) 26 - 192 U/L    CK - MB 1.5 <3.6 ng/ml    CK-MB Index 0.6 0.0 - 4.0 %    Troponin-I, QT <0.02 0.0 - 0.045 NG/ML   TROPONIN I    Collection Time: 08/01/19  5:20 PM   Result Value Ref Range    Troponin-I, QT <0.02 0.0 - 0.045 NG/ML       Radiologic Studies -   Xr Chest Pa Lat    Result Date: 8/1/2019   IMPRESSION: 1. No acute cardiopulmonary process. No change. Marked body habitus. Medical Decision Making     ED Course: Progress Notes, Reevaluation, and Consults:    4:32 PM Initial assessment performed.  The patients presenting problems have been discussed, and they/their family are in agreement with the care plan formulated and outlined with them. I have encouraged them to ask questions as they arise throughout their visit. Patient not in distress, chest pain has now resolved. Two sets of troponin are negative. X-ray chest is unremarkable. EKG no signs of ischemia    Provider Notes (Medical Decision Making): Patient patient feels comfortable on reassessment. No distress. No chest pain. EKG: no ischemic changes. X-ray chest no pneumonia. 2 Sets of troponins negative. Impression: atypical chest pain. Patient advised to follow-up with PMD and cardiology. Vital Signs-Reviewed the patient's vital signs. Reviewed pt's pulse ox reading. EKG: Interpreted by the EP. Time Interpreted: 4:15   Rate: 72   Rhythm:NSR   Interpretation: No ST changes    Records Reviewed: Old Medical Records (Time of Review: 4:32 PM)  -I am the first provider for this patient.  -I reviewed the vital signs, available nursing notes, past medical history, past surgical history, family history and social history. Current Outpatient Medications   Medication Sig Dispense Refill    benzonatate (TESSALON) 200 mg capsule Take 1 Cap by mouth three (3) times daily as needed for Cough. 30 Cap 0    albuterol (PROVENTIL HFA, VENTOLIN HFA, PROAIR HFA) 90 mcg/actuation inhaler Take 2 Puffs by inhalation every four (4) hours as needed for Wheezing. 1 Inhaler 0    azithromycin (ZITHROMAX Z-LORRIE) 250 mg tablet Take 1 Tab by mouth See Admin Instructions. 6 Tab 0    CARVEDILOL PO Take  by mouth.  atorvastatin (LIPITOR) 40 mg tablet Take 40 mg by mouth nightly.  metFORMIN (GLUCOPHAGE) 500 mg tablet Take 500 mg by mouth two (2) times daily (with meals).  spironolactone (ALDACTONE) 100 mg tablet Take 100 mg by mouth daily.  chlorthalidone (HYGROTEN) 25 mg tablet Take 25 mg by mouth daily.       hydrOXYzine HCl (ATARAX) 10 mg tablet Take  by mouth every eight (8) hours as needed for Itching or Anxiety.  ergocalciferol (VITAMIN D2) 50,000 unit capsule Take 50,000 Units by mouth every seven (7) days.  fluticasone-salmeterol (ADVAIR) 250-50 mcg/dose diskus inhaler Take 1 Puff by inhalation two (2) times a day. 1 Inhaler 1        Clinical Impression     Clinical Impression:   1. Atypical chest pain        Disposition: home      DISCHARGE NOTE:     Pt has been reexamined. Patient has no new complaints, changes, or physical findings. Care plan outlined and precautions discussed. Results of labs, EKG, x-ray were reviewed with the patient. All medications were reviewed with the patient; will d/c home with primary and cardiology f/u. All of pt's questions and concerns were addressed. Patient was instructed and agrees to follow up with  PCP and cardiology, as well as to return to the ED upon further deterioration. Patient is ready to go home. This note was dictated utilizing voice recognition software which may lead to typographical errors. I apologize in advance if the situation occurs. If questions arise please do not hesitate to contact me or call our department.     Pepe Ayala MD  4:32 PM

## 2022-01-24 ENCOUNTER — DOCUMENTATION ONLY (OUTPATIENT)
Dept: PULMONOLOGY | Age: 42
End: 2022-01-24

## 2022-01-24 NOTE — PROGRESS NOTES
Lf vm for pt to speak w/Tanika in regs to np sleep apt ref by NP Yulia Mancini; Split Study 3/20/2018 record is here. Per notes, pt is on NIV-what dme company does pt use?

## 2022-02-23 ENCOUNTER — OFFICE VISIT (OUTPATIENT)
Dept: PULMONOLOGY | Age: 42
End: 2022-02-23
Payer: MEDICAID

## 2022-02-23 VITALS
WEIGHT: 293 LBS | RESPIRATION RATE: 16 BRPM | SYSTOLIC BLOOD PRESSURE: 156 MMHG | BODY MASS INDEX: 48.82 KG/M2 | DIASTOLIC BLOOD PRESSURE: 95 MMHG | HEIGHT: 65 IN | HEART RATE: 83 BPM | TEMPERATURE: 98.1 F | OXYGEN SATURATION: 95 %

## 2022-02-23 DIAGNOSIS — J45.20 INTERMITTENT ASTHMA WITHOUT COMPLICATION, UNSPECIFIED ASTHMA SEVERITY: ICD-10-CM

## 2022-02-23 DIAGNOSIS — E66.01 MORBID OBESITY WITH BMI OF 70 AND OVER, ADULT (HCC): ICD-10-CM

## 2022-02-23 DIAGNOSIS — I10 HYPERTENSION, UNSPECIFIED TYPE: ICD-10-CM

## 2022-02-23 DIAGNOSIS — J96.12 CHRONIC RESPIRATORY FAILURE WITH HYPERCAPNIA (HCC): Primary | ICD-10-CM

## 2022-02-23 DIAGNOSIS — F41.9 ANXIETY: ICD-10-CM

## 2022-02-23 DIAGNOSIS — E11.8 CONTROLLED TYPE 2 DIABETES MELLITUS WITH COMPLICATION, WITHOUT LONG-TERM CURRENT USE OF INSULIN (HCC): ICD-10-CM

## 2022-02-23 DIAGNOSIS — E66.2 HYPOVENTILATION ASSOCIATED WITH OBESITY SYNDROME (HCC): ICD-10-CM

## 2022-02-23 PROCEDURE — 99205 OFFICE O/P NEW HI 60 MIN: CPT | Performed by: INTERNAL MEDICINE

## 2022-02-23 RX ORDER — EPLERENONE 25 MG/1
TABLET, FILM COATED ORAL
COMMUNITY
Start: 2021-12-30

## 2022-02-23 NOTE — Clinical Note
Looks like this patient was briefly on BIPAP before NIV. I think we can say she was a prior BIPAP failure.  I am going to try and continue the NIV- lets see what happens- Can someone call the RT for [employer] and lets see if we can simply renew what she has because it is working for her- we have to send the prior sleep study and BIPAP compliance report too which we have

## 2022-02-23 NOTE — LETTER
1/64/2270    Patient: Jose Armando Disla   YOB: 1980   Date of Visit: 2/23/2022     Nadege Carlson NP  [address] [facility] [address]  Via Fax: [phone]    Dear Nadege Carlson NP,      Thank you for referring Ms. Jose Armando Disla to [address] [place] [address] for evaluation. My notes for this consultation are attached. If you have questions, please do not hesitate to call me. I look forward to following your patient along with you.       Sincerely,    Krista Domingo, DO

## 2022-02-23 NOTE — PATIENT INSTRUCTIONS
Please call our clinic back at [phone] if you have not received a follow up appointment within 30 days prior the recommended follow up time. Please also call our office if you are not tolerating treatment plan and/or if you are experiencing any difficulties with the [employer]  [employer] you may be using or is assigned to you. If you have a CPAP/BIPAP or home ventilator device, your DME company is supposed to provide you with replacement filters, tubing and masks. You can either call them when you need new supplies or you can arrange for an automatic shipment schedule. Your need to be seen by our office at least annually to renew the prescription for these supplies. Please make note of who your DME company is and their phone number. Please make sure that you clean your mask and hosing on a regular basis.   Your DME can provide you with additional information regarding proper care and cleaning of your device- Thank you

## 2022-02-23 NOTE — PROGRESS NOTES
[facility]  Pulmonary, Critical Care, and Sleep Medicine    Office Progress Note- Initial Evaluation      Primary Care Physician: Eliana Flores NP     Reason for Visit:  Evaluation for Hypoventilation and NIV therapy    Assessment:  1. Obstructive Sleep Apnea with Obesity Hypoventilation Syndrome (HUNTER-OHS) with restrictive ventilatory defect, failed CPAP and then started in VPAP in 2019. Patient admitted to [facility] shortly after than with hypercapnic respiratory failure. She was then started on NIV-AVAP-AE. The patient has done well and continues with subjective clinical benefit. No further hospitalizations for respiratory failure. The patient is able to work and has a functional life. 2. Hypertension  3. Anxiety: Controlled. Atarax 10mg TID  4. Asthma: Mild- persistent, without acute exacerbation  5. DM  6. Dyslipidemia  7. Morbid Obesity: Body mass index is 70.22 kg/m². Plan:    · Continue patient on AVAPS-AE at current settings  · Renew on consumable supplies  · Healthy lifestyle changes to include weight loss and exercise discussed. · Healthy sleep habits were reviewed and encouraged. ·  and workplace safety reviewed and discussed as appropriate. Drowsy and/or inattentive driving should be avoided. · Follow up with Primary Care Provider (PCP) as directed and for routine health care maintenance. · Follow-up: numbers; 6 months, sooner should new symptoms or problems arise. History of Present Illness: Ms. Marcelo Albright is a 42 y.o. female patient who presents to establish care for ongoing NIV therapy. The history was provided by the patient. The patient was initially Diagnosed with severe HUNTER in March of 2018. The patient underwent sleep testing at that time. CPAP was noted to be ineffective. BIPAP 24/20 was subsequently ordered for the patient.   It looks like she had limited use of VPAP device in November of 2019 and  The patient was then admitted to [facility] 11/18-11/26/2019 for acute on chronic hypercapnic respiratory failure. The patient reports that she was not allowed to be discharged until she had her NIV device. The patient was treated with in patient NIV and subsequently discharged from the hospital with a Trilogy device ongoing outpatient treatment. The patient reports that she has used her device most nights since that time. She stated that prior to starting NIV- AVAPs, she would experience abrupt sleep onsets. These events are now resolved. Unfortunately, the patient has not had any follow up for her NIV since she was discharged with her device for [facility]. She reports that Respiratory Therapist from her DME (Prasanth Hdz) continues to come to her home monthly and the RT also brings her consumables. The patient continues to use a Trilogy device. She has not had any further hospital readmissions for respiratory failure since starting NIV-AVAPs 3 years ago. She is currently not registered in [employer]. The patient does have the phone number to her RT. Our office called and was able to obtain some compliance data    Patient reports using a FFM. She notes some xerostomia but it is tolerable. No skin breakdown. No bloating or aerophagia    Occupation:   [address] [place] [address]                   Work Schedule: [id number] M-F  Shift work: Sometimes provides [address]:  No- have poor vision at night, self restricts     Snoring: was a problem and significantly improved after starting Trilogy       Fatigue: was a problem prior to starting NIV. Patient use to spontaneously fall asleep       Dental: Teeth clenching or grinding is not reported. Naps: are reported but rare    Leg Symptoms: She does not have unpleasant or crawling sensation in legs or strong urge to move when inactive. Pain: Pain, typically does not disturb their sleep. GERD: is not reported.       Asthma; Reports being diagnosed during her first pregnancy ~1995.    - No hospitalization for exacerbations   - Triggers: respiratory infections, strong odors, perfumes     -  Worse seasons: Summer   - Meds: Advair 250/50, 1 inhalation BID, Albuterol PRN- requires less than once monthly for rescure    Mood: The patient describes their mood as: happy. She does have anxiety and take Atarax 10mg TID    Sleep-Wake History:     Estimates sleeping approximately 8-9 hours hours per night/day. Reports sleeping in a Bed with 2 pillows under their head. She gets into bed at approximately 4744-1327. Once in bed,she watches TV. It usually takes up to 4-5 hours hours to fall asleep after going to bed. Mostly because she is using her phone. If she does not use her phone, it takes 2 hours to fall asleep    She normally awakens with an alarm to start her day at 0900. She  typically gets out of bed if she has to go to work, it takes her about 15 minutes. If she does not have to work, she will stay in bed another 3-4 hours . Reports waking up from sleep to use the bathroom 1-3 time(s). Vivid dreams are reported but not typical    Waking up from sleep with a headache is reported if she does not use her NIV    Awakening with a dry mouth is reported. Symptom(s) suggestive of cataplexy are not reported. Sleep paralysis is not  reported. Hallucinations: are not reported. Sleep walking is not  reported. Sleep talking is not  reported. Other unusual and/or parasomnia behaviors are not reported. Family Sleep History:   Not known     NIV-Settings:  Device: Trilogy 100   Mode: AVAPS-AE  Circuit: Passive  AVAPS rate: 5 cwp/min  Vt: 400mls  Pmax: 35 cwp  PSmax: 35 cwp  PSmin: 4  EPAPmax: 16 cwp  EPAPmin: 5 cwp  Breath rate Auto   Trigger Type;  Auto trak  Rise Time 3    Compliance  11/1/21- 2/14/22:  % use > 4 hours/night: 55.7% total use 70%  Average use time: 05:45:00      Stop Michelle Fridge 2/23/2022   Does the patient snore loudly (louder than talking or loud enough to be heard through closed doors)? 1   Does the patient often feel tired, fatigued, or sleepy during the daytime, even after a \"good\" night's sleep? 1   Has anyone ever observed the patient stop breathing during their sleep?  0   Does the patient have or are they being treated for high blood pressure? 1   Is the patient's BMI greater than 35? 1   Is your neck circumference greater than 17 inches (Male) or 16 inches (Female)? 1   Is the patient older than 48? 0   Is the patient male? 0   HUNTER Score 5   Has the patient been referred to Sleep Medicine? 1   Has the patient previously been diagnosed with Obstructive Sleep Apnea? 1   Treated or Untreated? Treated       3 most recent PHQ Screens 2/23/2022   Little interest or pleasure in doing things Not at all   Feeling down, depressed, irritable, or hopeless Not at all   Total Score PHQ 2 0       [place] Scale 2/23/2022   Sitting and Reading 1   Watching TV 1   Sitting, inactive in a public place (e.g. a movie theater or meeting) 0   As a passenger in a car for an hour, without a break 0   Lying down to rest in the afternoon, when circumstances permit 2   Sitting and talking to someone 0   Sitting quietly after lunch without alcohol 2   In a car, while stopped for a few minutes in traffic 0   [place] Sleepiness Score 6             Immunization History: There is no immunization history on file for this patient.     Past Medical History:  Past Medical History:   Diagnosis Date    Anxiety     Anxiety     Asthma     Diabetes ([facility] [address]     HTN (hypertension)        Past Surgical History:  Past Surgical History:   Procedure Laterality Date    HX TUBAL LIGATION  2000       Family History:  Family History   Problem Relation Age of Onset   Fermin Ratliff Stroke Mother     Hypertension Mother     Hypertension Father        Social History:  Social History     Tobacco Use    Smoking status: Former Smoker    Smokeless tobacco: Former User   Substance Use Topics    Alcohol use: Yes     Comment: socially    Drug use: No        Caffeine Amount Time of last Intake Comments   Coffee None     Soda 2 cans/day Dinner: 1800 Coke, Pepi   Tea Rare 2 hours before bed Ginger Tea   Energy Drinks None     Over- the - counter stimulant pills None     Other Substances      Alcohol 2 cups of wine- weekends     Tobacco None     Drugs None     Other: None         Medications:  Current Outpatient Medications on File Prior to Visit   Medication Sig Dispense Refill    eplerenone (INSPRA) 25 mg tablet       albuterol (PROVENTIL HFA, VENTOLIN HFA, PROAIR HFA) 90 mcg/actuation inhaler Take 2 Puffs by inhalation every four (4) hours as needed for Wheezing. 1 Inhaler 0    CARVEDILOL PO Take  by mouth.  atorvastatin (LIPITOR) 40 mg tablet Take 40 mg by mouth nightly.  chlorthalidone (HYGROTEN) 25 mg tablet Take 25 mg by mouth daily.  hydrOXYzine HCl (ATARAX) 10 mg tablet Take  by mouth every eight (8) hours as needed for Itching or Anxiety.  fluticasone-salmeterol (ADVAIR) 250-50 mcg/dose diskus inhaler Take 1 Puff by inhalation two (2) times a day. 1 Inhaler 1    metFORMIN (GLUCOPHAGE) 500 mg tablet Take 500 mg by mouth two (2) times daily (with meals). (Patient not taking: Reported on 2/23/2022)      spironolactone (ALDACTONE) 100 mg tablet Take 100 mg by mouth daily. (Patient not taking: Reported on 2/23/2022)      ergocalciferol (VITAMIN D2) 50,000 unit capsule Take 50,000 Units by mouth every seven (7) days. (Patient not taking: Reported on 2/23/2022)       No current facility-administered medications on file prior to visit.         Allergy:  Allergies   Allergen Reactions    Aspirin Nausea and Vomiting and Swelling       Review of Systems  General ROS: negative for - chills, fatigue, fever, hot flashes, malaise, night sweats or sleep disturbance  ENT ROS: negative for - epistaxis, hearing change, nasal congestion, nasal discharge, nasal polyps, oral lesions, sinus pain, sneezing, tinnitus, vertigo, visual changes or vocal changes  Hematological and Lymphatic ROS: negative for - bleeding problems, blood clots, bruising, jaundice, pallor or swollen lymph nodes  Endocrine ROS: negative for - polydipsia/polyuria, skin changes, temperature intolerance or unexpected weight changes  Respiratory ROS: no cough, shortness of breath, or wheezing  Cardiovascular ROS: no chest pain or dyspnea on exertion  Gastrointestinal ROS: no abdominal pain, change in bowel habits, or black or bloody stools  Genito-Urinary ROS: no dysuria, trouble voiding, or hematuria  Musculoskeletal ROS: as noted above  Neurological ROS: no TIA or stroke symptoms  Dermatological ROS: negative for - pruritus, rash or skin lesion changes   Psychological ROS: as noted above  Otherwise negative. Physical Exam:  Blood pressure (!) 156/95, pulse 83, temperature 98.1 °F (36.7 °C), temperature source Oral, resp. rate 16, height 5' 5\" (1.651 m), weight (!) 191.4 kg (422 lb), SpO2 95 %. on RA, Body mass index is 70.22 kg/m². Neck circ. in \"inches\": 18.25    General: No distress, acyanotic, appears stated age, cooperative, pleasant, Morbidly obese body habitus  HEENT: PERRL, EOMI, throat without erythema or exudate, Tongue- large,dental indention on tongue, Mallampati's score 3+, Uvula- midline  Neck: Supple,  no abnormally enlarged lymph nodes, thyroid is not enlarged, non-tender, No JVD, No carotid bruits  Chest: Grossly normal.  Lungs: Moderate air entry, clear to auscultation bilaterally- No wheezing   Heart: Regular rate and rhythm, S1S2 present, without murmur. Abdomen: Obese,   abdomen is soft without significant tenderness, or guarding. Extremity: Negative for cyanosis, edema, or clubbing, Skin folds in arms  Skin: Skin color, texture, turgor normal. No rashes or lesions. Neurological: CN 2-12 grossly intact, normal muscle tone.     Data Reviewed:  CBC:   Lab Results   Component Value Date/Time    WBC 5.2 09/27/2019 01:40 PM    HGB 13.6 09/27/2019 01:40 PM    HCT 43.9 09/27/2019 01:40 PM    PLATELET 975 60/53/6896 01:40 PM    MCV 92.8 09/27/2019 01:40 PM       BMP:   Lab Results   Component Value Date/Time    Sodium 136 09/27/2019 01:40 PM    Potassium 3.9 09/27/2019 01:40 PM    Chloride 96 (L) 09/27/2019 01:40 PM    CO2 36 (H) 09/27/2019 01:40 PM    Anion gap 4 (L) 09/27/2019 01:40 PM    Glucose 96 09/27/2019 01:40 PM    BUN 12 09/27/2019 01:40 PM    Creatinine 1.2 09/27/2019 01:40 PM    BUN/Creatinine ratio 14 08/01/2019 03:00 PM    GFR est AA >60.0 09/27/2019 01:40 PM    GFR est non-AA 53 09/27/2019 01:40 PM    Calcium 9.5 09/27/2019 01:40 PM        TSH:  Lab Results   Component Value Date/Time    TSH 1.55 03/30/2010 11:40 AM     ABGs:- [facility]  Related to G3+ ISTAT  Component 11/20/19 11/20/19 11/19/19 11/19/19 11/18/19   PH 7.368 7.387 7.328 7.310 Low  7.259 Low    PCO2 78.3 High Panic  -- 81.3 81.4 High Panic  89.3 High Panic    PO2 70.0 Low  -- 69.0 62.0 Low  224. 0 High    BICARBONATE 45.0 High  47.9 High  42.8 41.2 High  40.0 High    T.CO2 47.0 High  >50.0 High  45.0 44.0 High  43.0 High    BASE EXCESS-BLOOD 20.0 High  -- 17.0 15.0 High  13.0 High    DRAW SITE LRadial R Brachial R RADIAL LRadial R RADIAL   SAMPLE TYPE ARTERIAL MIX RALPH ARTERIAL -- ARTERIAL   %O2 SATURATION ARTERIAL 92 Low  -- 91 88 Low Panic  100   LPM O2 -- -- 35.00 -- 15.00   FIO2 0.45 0.45 0.45 0.35 0.90   ALLENS TEST PASS Not Tested PASS PASS --   DELIVER SYSTEM NPPV NPPV HFNC NPPV [id number]   P/F RATIO 156 -- 153 177 249   PATIENT TEMPERATURE 99.0 F 99.3 F 98.1 F 98.1 F --     Component 04/21/20 02/24/20 11/25/19 11/23/19 11/23/19 11/22/19   Potassium 4.4 4.3 3.9 4.3 4.3 4.4   Sodium 137 140 138 137 -- 137   Chloride 93 Low  98 93 Low  91 Low  -- 90 Low    Glucose 132 High  102 82 97 -- 99   Calcium 10.0 9.5 8.9 9.2 -- 9.4   BUN 22 16 20 22 -- 23 High    Creatinine 1.2 1.1 1.2 1.0 -- 1.0   CO2 32 32 34 High  38 High  -- 37 High    eGFR  57.8 Low  >60.0 58.1 Low  >60.0 -- >60.0   eGFR Non  47.7 Low  52.8 47.9 Low  58.7 Low  -- 58.7 Low    Anion Gap 11.8 9.8 11.0 8.0 -- 10.0       Imaging:  [x]I have personally reviewed the patients radiographs section   Results from Hospital Encounter encounter on 09/27/19    XR CHEST PA LAT    Narrative  EXAM: Frontal and lateral views of the chest.    INDICATIONS: Chest discomfort. COMPARISON: Chest radiograph dated 3/11/2017. FINDINGS: No alveolar consolidations, congestive changes or pleural effusions. Cardiomediastinal silhouette normal in size and contour. Impression  IMPRESSION: No acute cardiopulmonary process. CTA-Chest: 11/18/19- [facility]    1. No definite pulmonary embolism identified. Notable exam limitations described above. 2. Extensive pulmonary opacities may represent multifocal edema or multifocal pneumonia. 3. Enlarged pulmonary artery, which could indicate pulmonary arterial hypertension. 4. Cholelithiasis. Results from [place] encounter on 12/19/12    CTA CHEST W WO CONT    Narrative  CTA chest- pulmonary embolus protocol    CPT code: 93509    Indications: Dyspnea. Technique: Utilizing pulmonary embolus protocol, thin section axial images were  obtained from the thoracic inlet into the upper abdomen after the uneventful  administration of IV contrast. Coronal and sagittal maximum intensity  projection (MIP) post-processed images were generated to better define  pulmonary artery anatomy and enhance sensitivty for detection of pulmonary  emboli. Contrast used: 100 cc Optiray 320. Comparison: None. Findings:    Adequate quality opacification to the first subsegmental branches. There are no convincing filling defects. Aorta and great vessels are normal.    There is a 10 mm pretracheal lymph node in the mediastinum. No pleural or  pericardial effusions.     There are are at least 2 small pulmonary nodules in the right lung, one  measuring about 9 mm upper lobe, image 21, and another in the right upper lobe  also image 21 measuring about 6 mm. There may be another small peripheral  nodule in the right lower lobe about 6 mm, image 18. There are no focal parenchymal abnormalities otherwise. Impression  [de-identified]    1.  No convincing pulmonary embolism    2. Several small nonspecific pulmonary nodules in the right lung are noted.     Thank you for this referral.       Cardiac Echo:     11/19/2019     NORMAL LEFT VENTRICULAR SYSTOLIC FUNCTION WITH AN ESTIMATED EJECTION FRACTION OF 55%.    UNABLE TO ASSESS DIASTOLIC FUNCTION.    MILDLY DILATED LEFT ATRIUM.    NORMAL RIGHT VENTRICULAR SIZE WITH MILDLY REDUCED SYSTOLIC FUNCTION.    NO HEMODYNAMICALLY SIGNIFICANT VALVULAR PATHOLOGY.    NORMAL PULMONARY ARTERY PRESSURE OF 21 MMHG.      PREVIOUS REPORT DONE ON 12/24/12 STATED AN EF OF 55%, DILATED LV, MODERATE DIASTOLIC    DYSFUNCTION, AND NO EVIDENCE OF TR OR PAP NOTED.      Clinical Indications: shortness of breath    ICD Codes: Technical Quality: Very technically difficult study             Historical Sleep Testing Data:    [facility] 1948 [facility]  · 3/20/2018: AHI: 202.4, SpO2 jostin: 50%, Frequent PVCs, Failed CPAP, Prescribed VPAP 24/20    Sanaz Miller DO, [facility]  Pulmonary, Sleep and Critical Care Medicine

## 2022-02-23 NOTE — PROGRESS NOTES
Ciarra Washburn presents today for   Chief Complaint   Patient presents with    New Patient    Sleep Apnea    Other     NIV       Is someone accompanying this pt? no    Is the patient using any DME equipment during OV? NIV    -DME Company unknown (previously on BiPAP through [address] [place] [address])    Have you ever had a sleep study done before? Yes; Where: Dr. Mario Blank, When: 2018    Depression Screening:  3 most recent PHQ Screens 2/23/2022   Little interest or pleasure in doing things Not at all   Feeling down, depressed, irritable, or hopeless Not at all   Total Score PHQ 2 0       Benkelman Sleepiness Scale:  Benkelman Sleepiness Scale  2/23/2022   Sitting and Reading 1   Watching TV 1   Sitting, inactive in a public place (e.g. a movie theater or meeting) 0   As a passenger in a car for an hour, without a break 0   Lying down to rest in the afternoon, when circumstances permit 2   Sitting and talking to someone 0   Sitting quietly after lunch without alcohol 2   In a car, while stopped for a few minutes in traffic 0   Benkelman Sleepiness Score 6       Stop-Bang:  Stop Blaine Mccollum 2/23/2022   Does the patient snore loudly (louder than talking or loud enough to be heard through closed doors)? 1   Does the patient often feel tired, fatigued, or sleepy during the daytime, even after a \"good\" night's sleep? 1   Has anyone ever observed the patient stop breathing during their sleep?  0   Does the patient have or are they being treated for high blood pressure? 1   Is the patient's BMI greater than 35? 1   Is your neck circumference greater than 17 inches (Male) or 16 inches (Female)? 1   Is the patient older than 48? 0   Is the patient male? 0   HUNTER Score 5   Has the patient been referred to Sleep Medicine? 1   Has the patient previously been diagnosed with Obstructive Sleep Apnea? 1   Treated or Untreated? Treated       Neck Circumference:  18.25\"      Coordination of Care:  1.  Have you been to the ER, urgent care clinic since your last visit? Hospitalized since your last visit? No    2. Have you seen or consulted any other health care providers outside of the [address] [place] [address] since your last visit? Include any pap smears or colon screening. NP, Khai Dickson    Medication list has been updated according to patient.

## 2022-03-18 PROBLEM — J45.901 ASTHMATIC BRONCHITIS WITH ACUTE EXACERBATION: Status: ACTIVE | Noted: 2017-03-12

## 2022-03-20 PROBLEM — R09.02 HYPOXIA: Status: ACTIVE | Noted: 2017-03-12

## 2024-12-19 ENCOUNTER — HOSPITAL ENCOUNTER (EMERGENCY)
Facility: HOSPITAL | Age: 44
Discharge: HOME | End: 2024-12-19

## 2024-12-19 VITALS
TEMPERATURE: 96.9 F | SYSTOLIC BLOOD PRESSURE: 118 MMHG | HEART RATE: 74 BPM | WEIGHT: 293 LBS | OXYGEN SATURATION: 97 % | DIASTOLIC BLOOD PRESSURE: 77 MMHG | RESPIRATION RATE: 20 BRPM | HEIGHT: 66 IN | BODY MASS INDEX: 47.09 KG/M2

## 2024-12-19 DIAGNOSIS — H92.01 OTALGIA OF RIGHT EAR: Primary | ICD-10-CM

## 2024-12-19 PROCEDURE — 99282 EMERGENCY DEPT VISIT SF MDM: CPT

## 2024-12-19 ASSESSMENT — ENCOUNTER SYMPTOMS
BACK PAIN: 0
SHORTNESS OF BREATH: 0
COUGH: 0

## 2024-12-19 ASSESSMENT — PAIN SCALES - GENERAL: PAINLEVEL_OUTOF10: 0

## 2024-12-19 ASSESSMENT — PAIN - FUNCTIONAL ASSESSMENT: PAIN_FUNCTIONAL_ASSESSMENT: 0-10

## 2024-12-19 NOTE — ED PROVIDER NOTES
DEPARTMENT COURSE and DIFFERENTIAL DIAGNOSIS/MDM:   Vitals:    Vitals:    12/19/24 1109   BP: 118/77   Pulse: 74   Resp: 20   Temp: 96.9 °F (36.1 °C)   TempSrc: Oral   SpO2: 97%   Weight: (!) 177.4 kg (391 lb)   Height: 1.676 m (5' 6\")           Medical Decision Making  45 y/o f presents to ED for evaluation of intermittent right ear discomfort that started after using q-tip last night to clean her ears. She is afebrile and normotensive. She is over-all well appearing. She has not symptoms at this time. Bilateral ear canal and TM are normal. No trismus. Oropharynx unremarkable. Neck smooth and supple.     PE and HPI does not warrant any imaging or labs. Discussed abstaining from q-tip use in ears and encourage flushing with warm water + peroxide mixture or otc earwax drops. Pt verbalizes understanding and agrees with POC.     Pt has been reexamined. Patient has no new complaints, changes, or physical findings.  Care plan outlined and precautions discussed.  Results were reviewed with the patient. All medications were reviewed with the patient. All of pt's questions and concerns were addressed.  Alarm symptoms and return precautions associated with chief complaint and evaluation were reviewed with the patient in detail.  The patient demonstrated adequate understanding.  Patient was instructed to follow up with PCP, as well as given strict return precautions to the ED upon further deterioration. Patient is ready for discharge home.        FINAL IMPRESSION      1. Otalgia of right ear          DISPOSITION/PLAN   DISPOSITION Decision To Discharge 12/19/2024 12:36:31 PM   DISPOSITION CONDITION Stable           PATIENT REFERRED TO:  Flor Peralta, APRN - NP  [address]  [facility] [address]  [phone]      As needed    [facility] EMERGENCY DEPT  [address] [facility] [address] [phone]    As needed, If symptoms worsen      DISCHARGE MEDICATIONS:  New Prescriptions    No medications